# Patient Record
Sex: FEMALE | Race: BLACK OR AFRICAN AMERICAN | Employment: PART TIME | URBAN - METROPOLITAN AREA
[De-identification: names, ages, dates, MRNs, and addresses within clinical notes are randomized per-mention and may not be internally consistent; named-entity substitution may affect disease eponyms.]

---

## 2017-03-08 RX ORDER — TIZANIDINE 4 MG/1
TABLET ORAL
Qty: 90 TABLET | Refills: 0 | Status: SHIPPED | OUTPATIENT
Start: 2017-03-08 | End: 2017-06-01

## 2017-03-08 NOTE — TELEPHONE ENCOUNTER
Refill Protocol Appointment Criteria  · Appointment scheduled in the past 6 months or in the next 3 months  Recent Visits       Provider Department Primary Dx    3 months ago Sanam Dover, [address] [facility], [place] Sinusitis, unspecified c

## 2017-03-10 RX ORDER — IBUPROFEN 600 MG/1
TABLET ORAL
Qty: 60 TABLET | Refills: 0 | Status: SHIPPED | OUTPATIENT
Start: 2017-03-10 | End: 2017-06-01

## 2017-03-14 RX ORDER — DOXEPIN HYDROCHLORIDE 25 MG/1
CAPSULE ORAL
Qty: 30 CAPSULE | Refills: 2 | Status: SHIPPED | OUTPATIENT
Start: 2017-03-14 | End: 2017-06-01

## 2017-03-14 NOTE — TELEPHONE ENCOUNTER
Please advise on refill request. Not taking as prescribed?   Last refilled on 11/29/16 #30 #2RF      Refill Protocol Appointment Criteria  · Appointment scheduled in the past 6 months or in the next 3 months  Recent Visits       Provider Department Primary

## 2017-05-29 ENCOUNTER — APPOINTMENT (OUTPATIENT)
Dept: GENERAL RADIOLOGY | Facility: HOSPITAL | Age: 52
End: 2017-05-29
Attending: NURSE PRACTITIONER
Payer: MEDICARE

## 2017-05-29 ENCOUNTER — HOSPITAL ENCOUNTER (EMERGENCY)
Facility: HOSPITAL | Age: 52
Discharge: HOME | End: 2017-05-29
Payer: MEDICARE

## 2017-05-29 VITALS
DIASTOLIC BLOOD PRESSURE: 67 MMHG | RESPIRATION RATE: 20 BRPM | BODY MASS INDEX: 27.94 KG/M2 | HEIGHT: 61 IN | OXYGEN SATURATION: 97 % | SYSTOLIC BLOOD PRESSURE: 130 MMHG | WEIGHT: 148 LBS | HEART RATE: 95 BPM | TEMPERATURE: 99 F

## 2017-05-29 DIAGNOSIS — J45.20 ASTHMATIC BRONCHITIS, MILD INTERMITTENT, UNCOMPLICATED: Primary | ICD-10-CM

## 2017-05-29 PROCEDURE — 94640 AIRWAY INHALATION TREATMENT: CPT

## 2017-05-29 PROCEDURE — 99283 EMERGENCY DEPT VISIT LOW MDM: CPT

## 2017-05-29 PROCEDURE — 71020 XR CHEST PA + LAT CHEST (CPT=71020): CPT | Performed by: NURSE PRACTITIONER

## 2017-05-29 RX ORDER — IPRATROPIUM BROMIDE AND ALBUTEROL SULFATE 2.5; .5 MG/3ML; MG/3ML
3 SOLUTION RESPIRATORY (INHALATION) ONCE
Status: COMPLETED | OUTPATIENT
Start: 2017-05-29 | End: 2017-05-29

## 2017-05-29 RX ORDER — ALBUTEROL SULFATE 2.5 MG/3ML
2.5 SOLUTION RESPIRATORY (INHALATION) EVERY 4 HOURS PRN
Qty: 30 AMPULE | Refills: 0 | Status: SHIPPED | OUTPATIENT
Start: 2017-05-29 | End: 2017-06-28

## 2017-05-29 NOTE — ED PROVIDER NOTES
Patient Seen in: [facility] AND [facility] Emergency Department    History   Patient presents with:  Cough/URI    Stated Complaint: COUGHING AND SOME SOB SINCE LAST NIGHT HX OF ASTHMA    HPI    Patient presents into the emergency room for evaluation of a cough. EVERY 8 HOURS AS NEEDED   TIZANIDINE HCL 4 MG Oral Tab,  TAKE 1 TABLET(4 MG) BY MOUTH EVERY 8 HOURS AS NEEDED   Albuterol Sulfate  (90 BASE) MCG/ACT Inhalation Aero Soln,  Inhale into the lungs every 6 (six) hours as needed for Wheezing.    Alexia °F (36.9 °C)  Resp 20  Ht 154.9 cm (5' 1\")  Wt 67.132 kg  BMI 27.98 kg/m2  SpO2 97%        Physical Exam   Constitutional: She is oriented to person, place, and time. She appears well-developed and well-nourished. No distress.    HENT:   Head: Normocephali

## 2017-06-01 ENCOUNTER — OFFICE VISIT (OUTPATIENT)
Dept: FAMILY MEDICINE CLINIC | Facility: CLINIC | Age: 52
End: 2017-06-01

## 2017-06-01 ENCOUNTER — TELEPHONE (OUTPATIENT)
Dept: FAMILY MEDICINE CLINIC | Facility: CLINIC | Age: 52
End: 2017-06-01

## 2017-06-01 VITALS
TEMPERATURE: 98 F | DIASTOLIC BLOOD PRESSURE: 87 MMHG | RESPIRATION RATE: 16 BRPM | SYSTOLIC BLOOD PRESSURE: 129 MMHG | BODY MASS INDEX: 26.55 KG/M2 | HEIGHT: 61 IN | HEART RATE: 72 BPM | WEIGHT: 140.63 LBS

## 2017-06-01 DIAGNOSIS — M62.838 NECK MUSCLE SPASM: ICD-10-CM

## 2017-06-01 DIAGNOSIS — J30.89 OTHER ALLERGIC RHINITIS: ICD-10-CM

## 2017-06-01 DIAGNOSIS — F51.04 PSYCHOPHYSIOLOGICAL INSOMNIA: ICD-10-CM

## 2017-06-01 DIAGNOSIS — Z23 NEED FOR VACCINATION: ICD-10-CM

## 2017-06-01 DIAGNOSIS — M54.2 NECK PAIN: ICD-10-CM

## 2017-06-01 DIAGNOSIS — F32.A DEPRESSIVE DISORDER: ICD-10-CM

## 2017-06-01 DIAGNOSIS — J45.901 ASTHMA EXACERBATION: Primary | ICD-10-CM

## 2017-06-01 PROBLEM — J44.89 ASTHMA WITH COPD (CHRONIC OBSTRUCTIVE PULMONARY DISEASE): Chronic | Status: ACTIVE | Noted: 2017-06-01

## 2017-06-01 PROBLEM — J44.9 ASTHMA WITH COPD (CHRONIC OBSTRUCTIVE PULMONARY DISEASE) (HCC): Chronic | Status: ACTIVE | Noted: 2017-06-01

## 2017-06-01 PROBLEM — J44.89 ASTHMA WITH COPD (CHRONIC OBSTRUCTIVE PULMONARY DISEASE) (HCC): Chronic | Status: ACTIVE | Noted: 2017-06-01

## 2017-06-01 PROCEDURE — G0009 ADMIN PNEUMOCOCCAL VACCINE: HCPCS | Performed by: FAMILY MEDICINE

## 2017-06-01 PROCEDURE — 90732 PPSV23 VACC 2 YRS+ SUBQ/IM: CPT | Performed by: FAMILY MEDICINE

## 2017-06-01 PROCEDURE — 99214 OFFICE O/P EST MOD 30 MIN: CPT | Performed by: FAMILY MEDICINE

## 2017-06-01 PROCEDURE — G0463 HOSPITAL OUTPT CLINIC VISIT: HCPCS | Performed by: FAMILY MEDICINE

## 2017-06-01 RX ORDER — MONTELUKAST SODIUM 10 MG/1
10 TABLET ORAL DAILY
Qty: 30 TABLET | Refills: 3 | Status: SHIPPED | OUTPATIENT
Start: 2017-06-01 | End: 2017-11-28

## 2017-06-01 RX ORDER — FLUTICASONE PROPIONATE 50 MCG
2 SPRAY, SUSPENSION (ML) NASAL DAILY
Qty: 1 BOTTLE | Refills: 3 | Status: SHIPPED | OUTPATIENT
Start: 2017-06-01 | End: 2017-09-29

## 2017-06-01 RX ORDER — DOXEPIN HYDROCHLORIDE 25 MG/1
CAPSULE ORAL
Qty: 30 CAPSULE | Refills: 3 | Status: SHIPPED | OUTPATIENT
Start: 2017-06-01 | End: 2018-02-20

## 2017-06-01 NOTE — TELEPHONE ENCOUNTER
[facility] stating that two of the medications that patient is currently taking interact with each other. Please call to clarify. Please advise.      Sertraline HCl 50 MG Oral Tab Take 1/2 tablet po daily for 1 week and then g

## 2017-06-01 NOTE — PROGRESS NOTES
Patient ID: Michel Jeronimo is a 46year old female.       Patient Information      Patient Name Sex KATLYN Shaw Female 1965       ED Provider Notes by ELLIS Strange at 2017 11:47 AM      Author: ELLIS Strange Service: (n emergency room but feels much better now. She states she has no cough, wheezing, chest pain, chest congestion but still has a stuffy nose and sometimes a runny nose. While here she states she has been very depressed for the last 2 months.   Her sister-i Media Tab - Date of Service 01-    REMOVAL GALLBLADDER              Current Outpatient Prescriptions:  albuterol sulfate (2.5 MG/3ML) 0.083% Inhalation Nebu Soln Take 3 mL (2.5 mg total) by nebulization every 4 (four) hours as needed for Wheezing or reviewed. ASSESSMENT/PLAN:     Diagnoses and all orders for this visit:    Asthma exacerbation  -     Montelukast Sodium (SINGULAIR) 10 MG Oral Tab;  Take 1 tablet (10 mg total) by mouth daily.  -     Fluticasone Propionate 50 MCG/ACT Nasal Suspensi

## 2017-06-02 NOTE — TELEPHONE ENCOUNTER
Let them know I am well aware but we will continue these medicines and watch her as she is doing well.

## 2017-06-22 DIAGNOSIS — M54.50 ACUTE LEFT-SIDED LOW BACK PAIN WITHOUT SCIATICA: ICD-10-CM

## 2017-06-22 DIAGNOSIS — S39.012A LUMBAR STRAIN, INITIAL ENCOUNTER: ICD-10-CM

## 2017-06-22 RX ORDER — IBUPROFEN 600 MG/1
TABLET ORAL
Qty: 60 TABLET | Refills: 0 | OUTPATIENT
Start: 2017-06-22

## 2017-06-26 RX ORDER — NABUMETONE 750 MG/1
TABLET, FILM COATED ORAL
Qty: 60 TABLET | Refills: 0 | OUTPATIENT
Start: 2017-06-26

## 2017-06-26 NOTE — TELEPHONE ENCOUNTER
Ramiro ag checking status of refill. Current Outpatient Prescriptions:  TIZANIDINE HCL 4 MG Oral Tab TAKE 1 TABLET(4 MG) BY MOUTH EVERY 8 HOURS AS NEEDED Disp: 90 tablet Rfl: 0`   Nabumetone 750 MG Oral Tab Take 1 tablet (750 mg total) by mouth 2 (two) times daily.  Disp: 60 tablet Rfl: 1`

## 2017-06-27 NOTE — TELEPHONE ENCOUNTER
She cannot take both take tizanidine and the cyclobenzaprine as they are both muscle relaxants. Which one works better for her?

## 2017-07-01 NOTE — TELEPHONE ENCOUNTER
Left message at home tel#[phone] bma [phone]. This is to clarify Dr Connor Jaquez question with pt which medication works better for pt tizanidine 4 mg or nabumetone 750mg? Can't have both.

## 2017-07-03 RX ORDER — NABUMETONE 750 MG/1
TABLET, FILM COATED ORAL
Qty: 60 TABLET | Refills: 0 | OUTPATIENT
Start: 2017-07-03

## 2017-07-03 RX ORDER — TIZANIDINE 4 MG/1
TABLET ORAL
Qty: 90 TABLET | Refills: 0 | Status: CANCELLED | OUTPATIENT
Start: 2017-07-03

## 2017-07-03 RX ORDER — TIZANIDINE 4 MG/1
TABLET ORAL
Qty: 90 TABLET | Refills: 0 | OUTPATIENT
Start: 2017-07-03

## 2017-07-03 RX ORDER — NABUMETONE 750 MG/1
750 TABLET, FILM COATED ORAL 2 TIMES DAILY
Qty: 60 TABLET | Refills: 1 | Status: SHIPPED | OUTPATIENT
Start: 2017-07-03 | End: 2018-01-25

## 2017-07-03 RX ORDER — TIZANIDINE 4 MG/1
TABLET ORAL
Qty: 90 TABLET | Refills: 0 | Status: SHIPPED | OUTPATIENT
Start: 2017-07-03 | End: 2017-12-26

## 2017-07-03 RX ORDER — CYCLOBENZAPRINE HCL 10 MG
TABLET ORAL
Qty: 90 TABLET | Refills: 0 | OUTPATIENT
Start: 2017-07-03

## 2017-07-25 ENCOUNTER — OFFICE VISIT (OUTPATIENT)
Dept: PAIN CLINIC | Facility: HOSPITAL | Age: 52
End: 2017-07-25
Attending: FAMILY MEDICINE
Payer: MEDICARE

## 2017-07-25 DIAGNOSIS — M50.20 HNP (HERNIATED NUCLEUS PULPOSUS), CERVICAL: Primary | ICD-10-CM

## 2017-07-25 PROCEDURE — 99211 OFF/OP EST MAY X REQ PHY/QHP: CPT | Performed by: NURSE PRACTITIONER

## 2017-07-25 NOTE — CHRONIC PAIN
Follow-up Note    HISTORY OF PRESENT ILLNESS:  Torri Jacob is a 46year old old female, originally referred to the pain clinic by Dr. Arnaldo Celestin, returns to the clinic forHNP (herniated nucleus pulposus), cervical  (primary encounter diagnosis) who was orig Negative  Psychiatric:  Negative  Hematologic: No active bleeding  Lymphatic: No current lymphedema  Allergic/Immunologic:  Negative  Musculoskeletal: As above  Neurological: As above  Denies chest pain, shortness of breath.     MEDICAL HISTORY:  Patient Ac 1/1/2010    Smokeless tobacco: Former User    Alcohol use Yes  0.0 oz/week     Comment: WEEKENDS    Drug use: No    Sexual activity: Yes    Partners: Male     Other Topics Concern    Caffeine Concern Yes    Comment: Soda, 3 cans a day     Social History Na small broad-based central/left paracentral disc         extrusion with minimal left paracentral subligamentous extension. Mild asymmetric central narrowing accentuated towards the left. Mild         right greater than left foraminal narrowing.  Face

## 2017-07-25 NOTE — PROGRESS NOTES
Patient here today as a returning patient for cervical pain that radiates down her back. Seen today by Dr. Jose Luis Wilson (see dictation). MD discussed doing a Cervical epidural steroidal injection.   Patient in agreement with plan of care and will obtain a ELENI

## 2017-08-18 ENCOUNTER — HOSPITAL ENCOUNTER (OUTPATIENT)
Facility: HOSPITAL | Age: 52
Setting detail: HOSPITAL OUTPATIENT SURGERY
Discharge: HOME | End: 2017-08-18
Attending: ANESTHESIOLOGY | Admitting: ANESTHESIOLOGY
Payer: MEDICARE

## 2017-08-18 ENCOUNTER — ANESTHESIA EVENT (OUTPATIENT)
Dept: SURGERY | Facility: HOSPITAL | Age: 52
End: 2017-08-18

## 2017-08-18 ENCOUNTER — ANESTHESIA (OUTPATIENT)
Dept: SURGERY | Facility: HOSPITAL | Age: 52
End: 2017-08-18

## 2017-08-18 ENCOUNTER — SURGERY (OUTPATIENT)
Age: 52
End: 2017-08-18

## 2017-08-18 ENCOUNTER — APPOINTMENT (OUTPATIENT)
Dept: GENERAL RADIOLOGY | Facility: HOSPITAL | Age: 52
End: 2017-08-18
Attending: ANESTHESIOLOGY
Payer: MEDICARE

## 2017-08-18 VITALS
HEART RATE: 70 BPM | OXYGEN SATURATION: 96 % | DIASTOLIC BLOOD PRESSURE: 83 MMHG | HEIGHT: 61 IN | BODY MASS INDEX: 28.32 KG/M2 | SYSTOLIC BLOOD PRESSURE: 130 MMHG | WEIGHT: 150 LBS | RESPIRATION RATE: 16 BRPM

## 2017-08-18 PROCEDURE — 3E0R33Z INTRODUCTION OF ANTI-INFLAMMATORY INTO SPINAL CANAL, PERCUTANEOUS APPROACH: ICD-10-PCS | Performed by: ANESTHESIOLOGY

## 2017-08-18 PROCEDURE — 77003 FLUOROGUIDE FOR SPINE INJECT: CPT | Performed by: ANESTHESIOLOGY

## 2017-08-18 PROCEDURE — 3E0R3BZ INTRODUCTION OF ANESTHETIC AGENT INTO SPINAL CANAL, PERCUTANEOUS APPROACH: ICD-10-PCS | Performed by: ANESTHESIOLOGY

## 2017-08-18 RX ORDER — LIDOCAINE HYDROCHLORIDE 10 MG/ML
INJECTION, SOLUTION EPIDURAL; INFILTRATION; INTRACAUDAL; PERINEURAL AS NEEDED
Status: DISCONTINUED | OUTPATIENT
Start: 2017-08-18 | End: 2017-08-18 | Stop reason: HOSPADM

## 2017-08-18 RX ORDER — TRIAMCINOLONE ACETONIDE 40 MG/ML
INJECTION, SUSPENSION INTRA-ARTICULAR; INTRAMUSCULAR AS NEEDED
Status: DISCONTINUED | OUTPATIENT
Start: 2017-08-18 | End: 2017-08-18 | Stop reason: HOSPADM

## 2017-08-18 RX ORDER — BUPIVACAINE HYDROCHLORIDE 2.5 MG/ML
INJECTION, SOLUTION EPIDURAL; INFILTRATION; INTRACAUDAL AS NEEDED
Status: DISCONTINUED | OUTPATIENT
Start: 2017-08-18 | End: 2017-08-18 | Stop reason: HOSPADM

## 2017-08-19 NOTE — OPERATIVE REPORT
[facility]    PATIENT'S NAME: [facility]   ATTENDING PHYSICIAN: Marcial Torres MD   OPERATING PHYSICIAN: Marcial Torres MD   PATIENT ACCOUNT#:   [de-identified]    LOCATION:  [place] [address]  MEDICAL RECORD #:   [id number]       DATE OF BIRTH:  [birth year].  The patient tolerated the procedure well and there were no apparent complications. Blood pressure monitoring, EKG, and pulse oximeter were utilized throughout the procedure. The patient's vital signs remained stable.   The patient had 5/5 upper ext

## 2017-10-09 ENCOUNTER — OFFICE VISIT (OUTPATIENT)
Dept: PODIATRY CLINIC | Facility: CLINIC | Age: 52
End: 2017-10-09

## 2017-10-09 ENCOUNTER — HOSPITAL ENCOUNTER (OUTPATIENT)
Dept: GENERAL RADIOLOGY | Age: 52
Discharge: HOME | End: 2017-10-09
Attending: PODIATRIST | Admitting: PODIATRIST
Payer: MEDICARE

## 2017-10-09 DIAGNOSIS — M79.672 LEFT FOOT PAIN: ICD-10-CM

## 2017-10-09 DIAGNOSIS — S90.32XA CONTUSION OF LEFT FOOT, INITIAL ENCOUNTER: ICD-10-CM

## 2017-10-09 DIAGNOSIS — M79.672 LEFT FOOT PAIN: Primary | ICD-10-CM

## 2017-10-09 PROCEDURE — 99213 OFFICE O/P EST LOW 20 MIN: CPT | Performed by: PODIATRIST

## 2017-10-09 PROCEDURE — 73630 X-RAY EXAM OF FOOT: CPT | Performed by: PODIATRIST

## 2017-10-09 PROCEDURE — G0463 HOSPITAL OUTPT CLINIC VISIT: HCPCS | Performed by: PODIATRIST

## 2017-10-09 NOTE — PROGRESS NOTES
HPI:    Patient ID: Yaneth Paez is a 45year old female. HPI  This 78-year-old female presents with a concern associated the middle toe of the right foot.   Apparently the medial border had a blister like that she opened up and now she is having no co ecchymosis there is no open or draining there is no obvious clinical deformity but based on the level of pain I have encouraged her to have an x-ray.   She was sent for an x-ray and I encouraged her to wear shoes limit weightbearing and ice until I have not

## 2017-12-29 RX ORDER — TIZANIDINE 4 MG/1
TABLET ORAL
Qty: 90 TABLET | Refills: 0 | Status: SHIPPED | OUTPATIENT
Start: 2017-12-29 | End: 2018-02-20

## 2018-01-17 ENCOUNTER — OFFICE VISIT (OUTPATIENT)
Dept: OBGYN CLINIC | Facility: CLINIC | Age: 53
End: 2018-01-17

## 2018-01-17 VITALS
WEIGHT: 125 LBS | DIASTOLIC BLOOD PRESSURE: 75 MMHG | HEART RATE: 87 BPM | SYSTOLIC BLOOD PRESSURE: 125 MMHG | BODY MASS INDEX: 24 KG/M2

## 2018-01-17 DIAGNOSIS — Z12.31 ENCOUNTER FOR SCREENING MAMMOGRAM FOR BREAST CANCER: ICD-10-CM

## 2018-01-17 DIAGNOSIS — Z01.419 ENCOUNTER FOR GYNECOLOGICAL EXAMINATION WITHOUT ABNORMAL FINDING: Primary | ICD-10-CM

## 2018-01-17 DIAGNOSIS — N94.9 VAGINAL BURNING: ICD-10-CM

## 2018-01-17 PROCEDURE — G0101 CA SCREEN;PELVIC/BREAST EXAM: HCPCS | Performed by: OBSTETRICS & GYNECOLOGY

## 2018-01-17 PROCEDURE — 99396 PREV VISIT EST AGE 40-64: CPT | Performed by: OBSTETRICS & GYNECOLOGY

## 2018-01-17 RX ORDER — MONTELUKAST SODIUM 10 MG/1
10 TABLET ORAL NIGHTLY
COMMUNITY
Start: 2017-12-26 | End: 2021-02-05

## 2018-01-17 NOTE — PROGRESS NOTES
Well Woman Exam    HPI:  The patient is a 48yo female who presents for annual exam. She notes new vaginal burning and itching which started over a week ago. Denies changes in her soaps or detergents.  States she has had BV in the past. Denies odor or discha tobacco: Former User    Alcohol use Yes  0.0 oz/week     Comment: WEEKENDS    Drug use: No    Sexual activity: Yes    Partners: Male     Other Topics Concern    Caffeine Concern Yes    Comment: Soda, 3 cans a day     Social History Narrative   None on file breast pain, lumps, or discharge. Neurological:  denies headaches, extremity weakness  Psychiatric: denies depression or anxiety.        01/17/18  1116   BP: 125/75   Pulse: 87       PHYSICAL EXAM:   Constitutional: well developed, well nourished  Head/Fa

## 2018-01-18 ENCOUNTER — TELEPHONE (OUTPATIENT)
Dept: OBGYN CLINIC | Facility: CLINIC | Age: 53
End: 2018-01-18

## 2018-01-18 RX ORDER — METRONIDAZOLE 500 MG/1
500 TABLET ORAL 2 TIMES DAILY
Qty: 14 TABLET | Refills: 0 | Status: SHIPPED | OUTPATIENT
Start: 2018-01-18 | End: 2018-02-20

## 2018-01-18 NOTE — TELEPHONE ENCOUNTER
----- Message from Roshni Woo MD sent at 1/18/2018 12:48 PM CST -----  Positive for BV. Please let patient know and send Rx for Flagyl 500mg BID for 7 days.

## 2018-01-18 NOTE — TELEPHONE ENCOUNTER
Pt informed of results and verbalizes understanding. Rx sent. Pt advised to avoid alcohol while taking medication. Pt also advised to use plain soap like Dive and to avoid douche or fragrant soaps.

## 2018-01-19 LAB
CANDIDA SCREEN: NEGATIVE
GENITAL VAGINOSIS SCREEN: POSITIVE
TRICHOMONAS SCREEN: NEGATIVE

## 2018-01-25 DIAGNOSIS — S39.012A LUMBAR STRAIN, INITIAL ENCOUNTER: ICD-10-CM

## 2018-01-25 DIAGNOSIS — M54.50 ACUTE LEFT-SIDED LOW BACK PAIN WITHOUT SCIATICA: ICD-10-CM

## 2018-01-26 ENCOUNTER — HOSPITAL ENCOUNTER (OUTPATIENT)
Dept: MAMMOGRAPHY | Age: 53
Discharge: HOME | End: 2018-01-26
Attending: OBSTETRICS & GYNECOLOGY
Payer: MEDICARE

## 2018-01-26 ENCOUNTER — HOSPITAL ENCOUNTER (OUTPATIENT)
Dept: MAMMOGRAPHY | Age: 53
End: 2018-01-26
Attending: OBSTETRICS & GYNECOLOGY
Payer: MEDICARE

## 2018-01-26 DIAGNOSIS — Z12.31 ENCOUNTER FOR SCREENING MAMMOGRAM FOR BREAST CANCER: ICD-10-CM

## 2018-01-26 PROCEDURE — 77067 SCR MAMMO BI INCL CAD: CPT | Performed by: OBSTETRICS & GYNECOLOGY

## 2018-01-29 NOTE — TELEPHONE ENCOUNTER
NABUMETONE Protocol failed, please advise on prescription request, NO APPT WITHIN LAST 6 MONTHS  Refill Protocol Appointment Criteria  · Appointment scheduled in the past 6 months or in the next 3 months  Recent Outpatient Visits            1 week ago Saundra

## 2018-01-30 RX ORDER — NABUMETONE 750 MG/1
TABLET, FILM COATED ORAL
Qty: 60 TABLET | Refills: 0 | Status: SHIPPED | OUTPATIENT
Start: 2018-01-30 | End: 2018-04-24

## 2018-02-20 ENCOUNTER — OFFICE VISIT (OUTPATIENT)
Dept: FAMILY MEDICINE CLINIC | Facility: CLINIC | Age: 53
End: 2018-02-20

## 2018-02-20 ENCOUNTER — HOSPITAL ENCOUNTER (OUTPATIENT)
Dept: GENERAL RADIOLOGY | Age: 53
Discharge: HOME | End: 2018-02-20
Attending: FAMILY MEDICINE | Admitting: FAMILY MEDICINE
Payer: MEDICARE

## 2018-02-20 VITALS
BODY MASS INDEX: 22.84 KG/M2 | HEART RATE: 78 BPM | HEIGHT: 61 IN | SYSTOLIC BLOOD PRESSURE: 119 MMHG | WEIGHT: 121 LBS | TEMPERATURE: 98 F | DIASTOLIC BLOOD PRESSURE: 75 MMHG

## 2018-02-20 DIAGNOSIS — F17.200 TOBACCO USE DISORDER: ICD-10-CM
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
## 2018-02-20 DIAGNOSIS — S46.812A STRAIN OF LEFT TRAPEZIUS MUSCLE, INITIAL ENCOUNTER: ICD-10-CM

## 2018-02-20 DIAGNOSIS — R63.4 WEIGHT LOSS: ICD-10-CM

## 2018-02-20 DIAGNOSIS — M54.2 CERVICALGIA: ICD-10-CM

## 2018-02-20 DIAGNOSIS — M54.2 NECK PAIN, BILATERAL: Primary | ICD-10-CM

## 2018-02-20 PROCEDURE — 71046 X-RAY EXAM CHEST 2 VIEWS: CPT | Performed by: FAMILY MEDICINE

## 2018-02-20 PROCEDURE — 99214 OFFICE O/P EST MOD 30 MIN: CPT | Performed by: FAMILY MEDICINE

## 2018-02-20 PROCEDURE — G0463 HOSPITAL OUTPT CLINIC VISIT: HCPCS | Performed by: FAMILY MEDICINE

## 2018-02-20 RX ORDER — TIZANIDINE 4 MG/1
TABLET ORAL
Qty: 90 TABLET | Refills: 0 | Status: SHIPPED | OUTPATIENT
Start: 2018-02-20 | End: 2018-03-21

## 2018-02-23 ENCOUNTER — LAB ENCOUNTER (OUTPATIENT)
Dept: LAB | Age: 53
End: 2018-02-23
Attending: FAMILY MEDICINE
Payer: MEDICARE

## 2018-02-23 DIAGNOSIS — R63.4 WEIGHT LOSS: ICD-10-CM

## 2018-02-23 LAB
ALBUMIN SERPL BCP-MCNC: 4.4 G/DL (ref 3.5–4.8)
ALBUMIN/GLOB SERPL: 1.5 {RATIO} (ref 1–2)
ALP SERPL-CCNC: 81 U/L (ref 32–100)
ALT SERPL-CCNC: 41 U/L (ref 14–54)
ANION GAP SERPL CALC-SCNC: 8 MMOL/L (ref 0–18)
AST SERPL-CCNC: 48 U/L (ref 15–41)
BASOPHILS # BLD: 0 K/UL (ref 0–0.2)
BASOPHILS NFR BLD: 1 %
BILIRUB SERPL-MCNC: 0.9 MG/DL (ref 0.3–1.2)
BUN SERPL-MCNC: 8 MG/DL (ref 8–20)
BUN/CREAT SERPL: 11.8 (ref 10–20)
CALCIUM SERPL-MCNC: 9.6 MG/DL (ref 8.5–10.5)
CHLORIDE SERPL-SCNC: 104 MMOL/L (ref 95–110)
CO2 SERPL-SCNC: 27 MMOL/L (ref 22–32)
CREAT SERPL-MCNC: 0.68 MG/DL (ref 0.5–1.5)
EOSINOPHIL # BLD: 0.1 K/UL (ref 0–0.7)
EOSINOPHIL NFR BLD: 3 %
ERYTHROCYTE [DISTWIDTH] IN BLOOD BY AUTOMATED COUNT: 13.5 % (ref 11–15)
GLOBULIN PLAS-MCNC: 2.9 G/DL (ref 2.5–3.7)
GLUCOSE SERPL-MCNC: 91 MG/DL (ref 70–99)
HCT VFR BLD AUTO: 40.7 % (ref 35–48)
HGB BLD-MCNC: 13.5 G/DL (ref 12–16)
LYMPHOCYTES # BLD: 1.3 K/UL (ref 1–4)
LYMPHOCYTES NFR BLD: 42 %
MCH RBC QN AUTO: 33.2 PG (ref 27–32)
MCHC RBC AUTO-ENTMCNC: 33.3 G/DL (ref 32–37)
MCV RBC AUTO: 99.8 FL (ref 80–100)
MONOCYTES # BLD: 0.4 K/UL (ref 0–1)
MONOCYTES NFR BLD: 11 %
NEUTROPHILS # BLD AUTO: 1.3 K/UL (ref 1.8–7.7)
NEUTROPHILS NFR BLD: 43 %
OSMOLALITY UR CALC.SUM OF ELEC: 286 MOSM/KG (ref 275–295)
PATIENT FASTING: YES
PLATELET # BLD AUTO: 269 K/UL (ref 140–400)
PMV BLD AUTO: 8.6 FL (ref 7.4–10.3)
POTASSIUM SERPL-SCNC: 4.2 MMOL/L (ref 3.3–5.1)
PROT SERPL-MCNC: 7.3 G/DL (ref 5.9–8.4)
RBC # BLD AUTO: 4.07 M/UL (ref 3.7–5.4)
SODIUM SERPL-SCNC: 139 MMOL/L (ref 136–144)
TSH SERPL-ACNC: 3.08 UIU/ML (ref 0.45–5.33)
WBC # BLD AUTO: 3.1 K/UL (ref 4–11)

## 2018-02-23 PROCEDURE — 84443 ASSAY THYROID STIM HORMONE: CPT

## 2018-02-23 PROCEDURE — 36415 COLL VENOUS BLD VENIPUNCTURE: CPT

## 2018-02-23 PROCEDURE — 85025 COMPLETE CBC W/AUTO DIFF WBC: CPT

## 2018-02-23 PROCEDURE — 80053 COMPREHEN METABOLIC PANEL: CPT

## 2018-02-27 ENCOUNTER — TELEPHONE (OUTPATIENT)
Dept: OTHER | Age: 53
End: 2018-02-27

## 2018-02-27 ENCOUNTER — TELEPHONE (OUTPATIENT)
Dept: FAMILY MEDICINE CLINIC | Facility: CLINIC | Age: 53
End: 2018-02-27

## 2018-02-27 DIAGNOSIS — S46.819D STRAIN OF TRAPEZIUS MUSCLE, UNSPECIFIED LATERALITY, SUBSEQUENT ENCOUNTER: ICD-10-CM

## 2018-02-27 DIAGNOSIS — M54.2 BILATERAL POSTERIOR NECK PAIN: Primary | ICD-10-CM

## 2018-02-27 DIAGNOSIS — D72.819 LEUKOPENIA, UNSPECIFIED TYPE: Primary | ICD-10-CM

## 2018-02-27 NOTE — TELEPHONE ENCOUNTER
----- Message from Nichelle Richards DO sent at 2/25/2018  3:19 PM CST -----  Thyroid test is normal.  Kidney function, sugar, liver function are okay. CBC shows that your white blood cell count is low but you are not anemic.   My nurse will order a CBC with

## 2018-02-27 NOTE — TELEPHONE ENCOUNTER
No, she should not take any over-the-counter anti-inflammatories I placed her on Relafen 750 mg twice daily which is the anti-inflammatory. If she needs to take anything on top of that she could take Tylenol.

## 2018-02-27 NOTE — TELEPHONE ENCOUNTER
Action Requested: Summary for Provider     []  Critical Lab, Recommendations Needed  [x] Need Additional Advice  []   FYI    [x]   Need Orders  [] Need Medications Sent to Pharmacy  []  Other     SUMMARY: Dr Vidal Goldstein, discussed labs with patient, agreed to

## 2018-02-27 NOTE — TELEPHONE ENCOUNTER
DR VAZQUEZ: do you approve she take OTC nsaids for pain for day time use? Muscle relaxers make her drowsy. Please advise before patient is contacted.

## 2018-02-27 NOTE — TELEPHONE ENCOUNTER
Spoke with patient (identified name and ), results reviewed and agrees with plan.   Will repeat non fasting labs on or after 3/23/18

## 2018-03-01 NOTE — TELEPHONE ENCOUNTER
Pt informed of Dr Addie Crawley orders from 2/27/18 who verbalized understanding and agreed with md plan. Pt just purchased the relafen today and will start it.

## 2018-03-21 DIAGNOSIS — M54.2 CERVICALGIA: ICD-10-CM

## 2018-03-21 DIAGNOSIS — M54.2 NECK PAIN, BILATERAL: ICD-10-CM

## 2018-03-21 DIAGNOSIS — S46.812A STRAIN OF LEFT TRAPEZIUS MUSCLE, INITIAL ENCOUNTER: ICD-10-CM

## 2018-03-25 RX ORDER — TIZANIDINE 4 MG/1
TABLET ORAL
Qty: 90 TABLET | Refills: 0 | Status: SHIPPED | OUTPATIENT
Start: 2018-03-25 | End: 2018-04-24

## 2018-04-02 ENCOUNTER — OFFICE VISIT (OUTPATIENT)
Dept: PAIN CLINIC | Facility: HOSPITAL | Age: 53
End: 2018-04-02
Attending: FAMILY MEDICINE
Payer: MEDICARE

## 2018-04-02 VITALS
SYSTOLIC BLOOD PRESSURE: 113 MMHG | HEIGHT: 61 IN | HEART RATE: 76 BPM | WEIGHT: 124 LBS | BODY MASS INDEX: 23.41 KG/M2 | DIASTOLIC BLOOD PRESSURE: 74 MMHG | RESPIRATION RATE: 16 BRPM

## 2018-04-02 DIAGNOSIS — M54.2 CERVICALGIA: ICD-10-CM

## 2018-04-02 DIAGNOSIS — M54.2 NECK PAIN, BILATERAL: ICD-10-CM

## 2018-04-02 PROCEDURE — 99211 OFF/OP EST MAY X REQ PHY/QHP: CPT

## 2018-04-02 NOTE — CHRONIC PAIN
Follow-up Note    HISTORY OF PRESENT ILLNESS:  George Phillips is a 45year old old female, originally referred to the pain clinic by Dr. Mavis Hoskins, returns to the clinic forNeck pain, bilateral  Cervicalgia who was originally seen by Dr. Tyesha Velarde in September Chalazion of left upper eyelid     Acute chest pain     Azotemia     Abnormal EKG     Depressive disorder     Asthma with COPD (chronic obstructive pulmonary disease) (HCC)    Past Medical History:   Diagnosis Date   • Asthma    • Back problem    • Cholecy BP: 113/74   Pulse: 76   Resp: 16      General: Alert and oriented x3  Affect:  NAD  Eyes: anicteric; no injection  Gait: Normal;  cane user - No  Spine: Normal trapezial tenderness and tightening bilaterally no isolated trigger points noted      IMAGING foraminal narrowing. Facet joints intact. C6-C7: No significant disc/facet abnormality, spinal stenosis, or foraminal  stenosis. C7-T1:Mild disc degeneration. No herniation. Facet joints intact. No  stenosis.     CONCLUSION:  1.  C5-6: Disc degeneration w

## 2018-04-18 ENCOUNTER — TELEPHONE (OUTPATIENT)
Dept: PAIN CLINIC | Facility: HOSPITAL | Age: 53
End: 2018-04-18

## 2018-04-24 ENCOUNTER — OFFICE VISIT (OUTPATIENT)
Dept: FAMILY MEDICINE CLINIC | Facility: CLINIC | Age: 53
End: 2018-04-24

## 2018-04-24 VITALS
SYSTOLIC BLOOD PRESSURE: 126 MMHG | TEMPERATURE: 99 F | RESPIRATION RATE: 12 BRPM | DIASTOLIC BLOOD PRESSURE: 75 MMHG | HEIGHT: 61 IN | BODY MASS INDEX: 23.11 KG/M2 | WEIGHT: 122.38 LBS | HEART RATE: 66 BPM

## 2018-04-24 DIAGNOSIS — M50.30 DDD (DEGENERATIVE DISC DISEASE), CERVICAL: ICD-10-CM

## 2018-04-24 DIAGNOSIS — M54.2 NECK PAIN, BILATERAL POSTERIOR: ICD-10-CM

## 2018-04-24 DIAGNOSIS — M54.2 NECK PAIN, BILATERAL: ICD-10-CM

## 2018-04-24 DIAGNOSIS — M62.838 NECK MUSCLE SPASM: ICD-10-CM

## 2018-04-24 DIAGNOSIS — S46.819D STRAIN OF TRAPEZIUS MUSCLE, UNSPECIFIED LATERALITY, SUBSEQUENT ENCOUNTER: Primary | ICD-10-CM

## 2018-04-24 PROCEDURE — 99212 OFFICE O/P EST SF 10 MIN: CPT | Performed by: FAMILY MEDICINE

## 2018-04-24 PROCEDURE — 99214 OFFICE O/P EST MOD 30 MIN: CPT | Performed by: FAMILY MEDICINE

## 2018-04-24 RX ORDER — TIZANIDINE 4 MG/1
TABLET ORAL
Qty: 270 TABLET | Refills: 0 | Status: SHIPPED | OUTPATIENT
Start: 2018-04-24 | End: 2018-06-19

## 2018-04-24 RX ORDER — NABUMETONE 750 MG/1
TABLET, FILM COATED ORAL
Qty: 180 TABLET | Refills: 0 | Status: SHIPPED | OUTPATIENT
Start: 2018-04-24 | End: 2018-06-19

## 2018-04-24 NOTE — PROGRESS NOTES
Patient ID: Lexi Arnold is a 45year old female. HPI  Patient presents with: Follow - Up: neck pain      She will get a second epidural steroid injection in her neck on May 4, 2018 by the pain clinic.   She had one in August 2017 which did help cons Rfl: 0   NABUMETONE 750 MG Oral Tab TAKE 1 TABLET(750 MG) BY MOUTH TWICE DAILY Disp: 60 tablet Rfl: 0   Montelukast Sodium 10 MG Oral Tab  Disp:  Rfl:    Albuterol Sulfate  (90 BASE) MCG/ACT Inhalation Aero Soln Inhale into the lungs every 6 (six) h Nerve Stimulator (STANDARD TENS) Does not apply Device; Apply 1 Units topically as needed. Use the HOME TENS unit for electrical stimulation as needed for discomfort and pain on the muscles. Fax form to Scar at [facility] at [phone].     She i trapezius muscle.   -     TiZANidine HCl 4 MG Oral Tab; TAKE 1 TABLET(4 MG) BY MOUTH EVERY 8 HOURS AS NEEDED  -     Nabumetone 750 MG Oral Tab; TAKE 1 TABLET(750 MG) BY MOUTH TWICE DAILY    DDD (degenerative disc disease), cervical  -     Nerve Stimulator (

## 2018-05-01 ENCOUNTER — OFFICE VISIT (OUTPATIENT)
Dept: FAMILY MEDICINE CLINIC | Facility: CLINIC | Age: 53
End: 2018-05-01

## 2018-05-01 ENCOUNTER — NURSE TRIAGE (OUTPATIENT)
Dept: OTHER | Age: 53
End: 2018-05-01

## 2018-05-01 VITALS
WEIGHT: 123 LBS | SYSTOLIC BLOOD PRESSURE: 103 MMHG | HEART RATE: 55 BPM | TEMPERATURE: 98 F | DIASTOLIC BLOOD PRESSURE: 64 MMHG | BODY MASS INDEX: 23.22 KG/M2 | HEIGHT: 61 IN

## 2018-05-01 DIAGNOSIS — M54.2 NECK PAIN, BILATERAL POSTERIOR: ICD-10-CM

## 2018-05-01 DIAGNOSIS — M50.30 DDD (DEGENERATIVE DISC DISEASE), CERVICAL: ICD-10-CM

## 2018-05-01 DIAGNOSIS — Z63.6 CAREGIVER BURDEN: ICD-10-CM

## 2018-05-01 DIAGNOSIS — M62.838 NECK MUSCLE SPASM: Primary | ICD-10-CM

## 2018-05-01 PROCEDURE — 99212 OFFICE O/P EST SF 10 MIN: CPT | Performed by: FAMILY MEDICINE

## 2018-05-01 PROCEDURE — 99213 OFFICE O/P EST LOW 20 MIN: CPT | Performed by: FAMILY MEDICINE

## 2018-05-01 SDOH — SOCIAL STABILITY - SOCIAL INSECURITY: DEPENDENT RELATIVE NEEDING CARE AT HOME: Z63.6

## 2018-05-01 NOTE — PROGRESS NOTES
HPI:    Patient ID: Lexi Arnold is a 45year old female. HPI     Patient here with complains of having spasm on the left side of her neck.   She states she has had recurrent chronic neck pain and is scheduled to have her third epidural injection in he Rfl: 0   Montelukast Sodium 10 MG Oral Tab  Disp:  Rfl:      Allergies:No Known Allergies   PHYSICAL EXAM:   Physical Exam   Constitutional: She appears well-developed and well-nourished. Musculoskeletal: She exhibits tenderness.  She exhibits no edema or

## 2018-05-01 NOTE — TELEPHONE ENCOUNTER
Action Requested: Summary for Provider     []  Critical Lab, Recommendations Needed  [] Need Additional Advice  []   FYI    []   Need Orders  [] Need Medications Sent to Pharmacy  []  Other     SUMMARY: Pt was scheduled for OV today.     Pt called due to wo

## 2018-05-04 ENCOUNTER — OFFICE VISIT (OUTPATIENT)
Dept: PAIN CLINIC | Facility: HOSPITAL | Age: 53
End: 2018-05-04
Attending: ANESTHESIOLOGY
Payer: MEDICARE

## 2018-05-04 VITALS — DIASTOLIC BLOOD PRESSURE: 77 MMHG | SYSTOLIC BLOOD PRESSURE: 114 MMHG | OXYGEN SATURATION: 99 % | HEART RATE: 71 BPM

## 2018-05-04 DIAGNOSIS — M50.30 DDD (DEGENERATIVE DISC DISEASE), CERVICAL: Primary | ICD-10-CM

## 2018-05-04 PROCEDURE — 99211 OFF/OP EST MAY X REQ PHY/QHP: CPT

## 2018-05-04 RX ORDER — METHYLPREDNISOLONE 4 MG/1
TABLET ORAL
Qty: 21 TABLET | Refills: 0 | Status: SHIPPED | OUTPATIENT
Start: 2018-05-04 | End: 2018-06-19

## 2018-05-04 NOTE — CHRONIC PAIN
Follow-up Note  CC: neck pain   HISTORY OF PRESENT ILLNESS:  Bhavesh Rosales is a 45year old old female, originally referred to the pain clinic by Dr. Mauricio ref.  provider found, with history of DDD (degenerative disc disease), cervical  (primary encounter ellen not exacerbate the pain.   Numbness/tingling: as above  Weakness: as above  Weight Loss: Negative   Fever: Negative   Cardiovascular:  No current chest pain or palpitations   Respiratory:  No current shortness of breath   Gastrointestinal:  No active ulcer Glaucoma Father    • Myocardial infarction [OTHER] Father      per [employer]       SOCIAL HISTORY:    Social History  Social History   Marital status: Single  Spouse name: N/A    Years of education: N/A  Number of children: N/A     Occupational History  None secondary to cervical DDD. Unfortunately patient thought her injection was to be done today but was really her follow up appointment after her injection that was scheduled for 4/20/18. Plan to reschedule patient for the injection. Ordered medrol dose pack.

## 2018-05-09 ENCOUNTER — TELEPHONE (OUTPATIENT)
Dept: PAIN CLINIC | Facility: HOSPITAL | Age: 53
End: 2018-05-09

## 2018-05-15 ENCOUNTER — SURGERY (OUTPATIENT)
Age: 53
End: 2018-05-15

## 2018-05-15 ENCOUNTER — APPOINTMENT (OUTPATIENT)
Dept: GENERAL RADIOLOGY | Facility: HOSPITAL | Age: 53
End: 2018-05-15
Attending: ANESTHESIOLOGY
Payer: MEDICARE

## 2018-05-15 ENCOUNTER — HOSPITAL ENCOUNTER (OUTPATIENT)
Facility: HOSPITAL | Age: 53
Setting detail: HOSPITAL OUTPATIENT SURGERY
Discharge: HOME | End: 2018-05-15
Attending: ANESTHESIOLOGY | Admitting: ANESTHESIOLOGY
Payer: MEDICARE

## 2018-05-15 VITALS
RESPIRATION RATE: 16 BRPM | DIASTOLIC BLOOD PRESSURE: 72 MMHG | TEMPERATURE: 98 F | BODY MASS INDEX: 22.84 KG/M2 | SYSTOLIC BLOOD PRESSURE: 113 MMHG | WEIGHT: 121 LBS | HEIGHT: 61 IN | OXYGEN SATURATION: 98 % | HEART RATE: 66 BPM

## 2018-05-15 PROCEDURE — 3E0R3BZ INTRODUCTION OF ANESTHETIC AGENT INTO SPINAL CANAL, PERCUTANEOUS APPROACH: ICD-10-PCS | Performed by: ANESTHESIOLOGY

## 2018-05-15 PROCEDURE — B01B1ZZ FLUOROSCOPY OF SPINAL CORD USING LOW OSMOLAR CONTRAST: ICD-10-PCS | Performed by: ANESTHESIOLOGY

## 2018-05-15 PROCEDURE — 3E0R33Z INTRODUCTION OF ANTI-INFLAMMATORY INTO SPINAL CANAL, PERCUTANEOUS APPROACH: ICD-10-PCS | Performed by: ANESTHESIOLOGY

## 2018-05-15 PROCEDURE — 77003 FLUOROGUIDE FOR SPINE INJECT: CPT | Performed by: ANESTHESIOLOGY

## 2018-05-15 RX ORDER — LIDOCAINE HYDROCHLORIDE 10 MG/ML
INJECTION, SOLUTION EPIDURAL; INFILTRATION; INTRACAUDAL; PERINEURAL AS NEEDED
Status: DISCONTINUED | OUTPATIENT
Start: 2018-05-15 | End: 2018-05-15 | Stop reason: HOSPADM

## 2018-05-15 RX ORDER — DEXAMETHASONE SODIUM PHOSPHATE 10 MG/ML
INJECTION, SOLUTION INTRAMUSCULAR; INTRAVENOUS AS NEEDED
Status: DISCONTINUED | OUTPATIENT
Start: 2018-05-15 | End: 2018-05-15 | Stop reason: HOSPADM

## 2018-05-15 NOTE — PROCEDURES
Patient was taken to operating room #1. Prone position. Sterile preparation and draping of cervical spine. Lidocaine 1% 3 cc local to C6-7 interspace out it was identified with fluoroscopy.   18-gauge epidural needle was used and epidural space loss-of-r

## 2018-05-15 NOTE — H&P
History & Physical Examination    Patient Name: Salinas Sykes  MRN: [id number]  CSN: [id number]  YOB: 1965    Diagnosis: Low back pain due to degenerative disease of cervical spine with herniation of C6-7.     Present Illness: Patient has mari per [employer]:  \" x3, C/S x1\"     Past Surgical History:  1998:   2006: CHOLECYSTECTOMY  2012: ELECTROCARDIOGRAM, COMPLETE      Comment: Scanned to Media Tab - Date of Service                2012  No date:       Comment:

## 2018-06-19 ENCOUNTER — OFFICE VISIT (OUTPATIENT)
Dept: FAMILY MEDICINE CLINIC | Facility: CLINIC | Age: 53
End: 2018-06-19

## 2018-06-19 VITALS
BODY MASS INDEX: 22.69 KG/M2 | TEMPERATURE: 99 F | HEIGHT: 61 IN | WEIGHT: 120.19 LBS | RESPIRATION RATE: 12 BRPM | SYSTOLIC BLOOD PRESSURE: 115 MMHG | HEART RATE: 78 BPM | DIASTOLIC BLOOD PRESSURE: 73 MMHG

## 2018-06-19 DIAGNOSIS — F51.04 PSYCHOPHYSIOLOGICAL INSOMNIA: ICD-10-CM

## 2018-06-19 DIAGNOSIS — F32.A DEPRESSIVE DISORDER: ICD-10-CM

## 2018-06-19 DIAGNOSIS — G44.209 TENSION HEADACHE: ICD-10-CM

## 2018-06-19 DIAGNOSIS — S46.819D STRAIN OF TRAPEZIUS MUSCLE, UNSPECIFIED LATERALITY, SUBSEQUENT ENCOUNTER: ICD-10-CM

## 2018-06-19 DIAGNOSIS — M54.2 NECK PAIN, BILATERAL: ICD-10-CM

## 2018-06-19 DIAGNOSIS — M62.838 NECK MUSCLE SPASM: ICD-10-CM

## 2018-06-19 DIAGNOSIS — M50.30 DDD (DEGENERATIVE DISC DISEASE), CERVICAL: ICD-10-CM

## 2018-06-19 DIAGNOSIS — F41.9 ANXIETY: ICD-10-CM

## 2018-06-19 DIAGNOSIS — M54.2 NECK PAIN, BILATERAL POSTERIOR: Primary | ICD-10-CM

## 2018-06-19 DIAGNOSIS — M62.838 TRAPEZIUS MUSCLE SPASM: ICD-10-CM

## 2018-06-19 DIAGNOSIS — Z63.6 CAREGIVER BURDEN: ICD-10-CM

## 2018-06-19 PROCEDURE — 99214 OFFICE O/P EST MOD 30 MIN: CPT | Performed by: FAMILY MEDICINE

## 2018-06-19 PROCEDURE — 99212 OFFICE O/P EST SF 10 MIN: CPT | Performed by: FAMILY MEDICINE

## 2018-06-19 RX ORDER — CLONAZEPAM 0.5 MG/1
0.5 TABLET ORAL NIGHTLY PRN
Qty: 30 TABLET | Refills: 1 | Status: SHIPPED | OUTPATIENT
Start: 2018-06-19 | End: 2018-08-06

## 2018-06-19 RX ORDER — NABUMETONE 750 MG/1
TABLET, FILM COATED ORAL
Qty: 180 TABLET | Refills: 0 | Status: SHIPPED | OUTPATIENT
Start: 2018-06-19 | End: 2021-02-05

## 2018-06-19 RX ORDER — TIZANIDINE 4 MG/1
TABLET ORAL
Qty: 270 TABLET | Refills: 0 | Status: SHIPPED | OUTPATIENT
Start: 2018-06-19 | End: 2021-02-05

## 2018-06-19 SDOH — SOCIAL STABILITY - SOCIAL INSECURITY: DEPENDENT RELATIVE NEEDING CARE AT HOME: Z63.6

## 2018-06-19 NOTE — PROGRESS NOTES
Patient ID: Haim Wise is a 45year old female. HPI  Patient presents with:  Pain: neck    She had an epidural steroid injection #3 in May. She states after that her right side of the neck started feeling even worse.   She had more of the epidural Comment: Scanned to Media Tab - Date of Service                2012  No date:       Comment: x3  No date: REMOVAL GALLBLADDER       Current Outpatient Prescriptions:        TiZANidine HCl 4 MG Oral Tab TAKE 1 TABLET(4 MG) BY MOUTH EVERY 8 HOURS A all orders for this visit:    Neck pain, bilateral posterior  -     TiZANidine HCl 4 MG Oral Tab; TAKE 1 TABLET(4 MG) BY MOUTH EVERY 8 HOURS AS NEEDED  -     Nabumetone 750 MG Oral Tab; TAKE 1 TABLET(750 MG) BY MOUTH TWICE DAILY  -     PHYSICAL THERAPY EXT herself. She will start Klonopin and see if this helps more than the doxepin. She must stop the doxepin.   She did not take Klonopin and the tizanidine at the same time and I told her that tizanidine should be taken at dinnertime at the latest.  Psychophy

## 2018-06-19 NOTE — PATIENT INSTRUCTIONS
Keep taking the nabumetone 750 mg twice daily with food. You could take the tizanidine 4 mg 3 times daily as needed for muscle relaxation but at this time I would just take it 3 times daily as her muscles are completely tight.   Take the last dose of tizan

## 2018-07-23 ENCOUNTER — OFFICE VISIT (OUTPATIENT)
Dept: FAMILY MEDICINE CLINIC | Facility: CLINIC | Age: 53
End: 2018-07-23
Payer: MEDICARE

## 2018-07-23 VITALS
SYSTOLIC BLOOD PRESSURE: 119 MMHG | HEART RATE: 80 BPM | TEMPERATURE: 98 F | BODY MASS INDEX: 23 KG/M2 | DIASTOLIC BLOOD PRESSURE: 73 MMHG | WEIGHT: 120 LBS

## 2018-07-23 DIAGNOSIS — R20.2 PARESTHESIA OF BILATERAL LEGS: ICD-10-CM

## 2018-07-23 DIAGNOSIS — R29.898 WEAKNESS OF BOTH LEGS: Primary | ICD-10-CM

## 2018-07-23 DIAGNOSIS — M50.30 DDD (DEGENERATIVE DISC DISEASE), CERVICAL: ICD-10-CM

## 2018-07-23 PROCEDURE — 99214 OFFICE O/P EST MOD 30 MIN: CPT | Performed by: FAMILY MEDICINE

## 2018-07-23 PROCEDURE — 99212 OFFICE O/P EST SF 10 MIN: CPT | Performed by: FAMILY MEDICINE

## 2018-07-23 NOTE — PROGRESS NOTES
Patient ID: Haim Wise is a 45year old female. HPI  Patient presents with:  Leg Pain: pt c/o bilateral leg weakness and numbness X 1 week        Her legs feel funny when she rubs them from her thighs down to her calves.   It is somewhat of a \"numb CHOLECYSTECTOMY  2012: ELECTROCARDIOGRAM, COMPLETE      Comment: Scanned to Media Tab - Date of Service                2012  No date:       Comment: x3  No date: REMOVAL GALLBLADDER       Current Outpatient Prescriptions:  TiZANidine HCl 4 was intact, good strength with plantar flexion and dorsiflexion, gait was normal.  Able to flex forward at the waist and touch the ankles. No bony tenderness. Straight leg raise is negative bilaterally.     When I did get her up off the table to stand o Shaylee Gilbert MD          Requested Specialty: PHYSIATRY          Number of Visits Requested: 3      Physical Therapy ([facility]) - [place] Locations          Order Comments:              Treatment: Evaluate & Treat and use Modalities if needed               Physici

## 2018-07-24 DIAGNOSIS — M62.838 NECK MUSCLE SPASM: ICD-10-CM

## 2018-07-24 DIAGNOSIS — S46.819D STRAIN OF TRAPEZIUS MUSCLE, UNSPECIFIED LATERALITY, SUBSEQUENT ENCOUNTER: ICD-10-CM

## 2018-07-24 DIAGNOSIS — M50.30 DDD (DEGENERATIVE DISC DISEASE), CERVICAL: ICD-10-CM

## 2018-07-24 DIAGNOSIS — M54.2 NECK PAIN, BILATERAL POSTERIOR: ICD-10-CM

## 2018-07-24 DIAGNOSIS — M54.2 NECK PAIN, BILATERAL: ICD-10-CM

## 2018-07-24 RX ORDER — TIZANIDINE 4 MG/1
TABLET ORAL
Qty: 270 TABLET | Refills: 0 | OUTPATIENT
Start: 2018-07-24

## 2018-07-25 ENCOUNTER — HOSPITAL ENCOUNTER (OUTPATIENT)
Dept: GENERAL RADIOLOGY | Age: 53
Discharge: HOME | End: 2018-07-25
Attending: FAMILY MEDICINE
Payer: MEDICARE

## 2018-07-25 DIAGNOSIS — R29.898 WEAKNESS OF BOTH LEGS: ICD-10-CM

## 2018-07-25 DIAGNOSIS — R20.2 PARESTHESIA OF BILATERAL LEGS: ICD-10-CM

## 2018-07-25 PROCEDURE — 72110 X-RAY EXAM L-2 SPINE 4/>VWS: CPT | Performed by: FAMILY MEDICINE

## 2018-07-30 ENCOUNTER — APPOINTMENT (OUTPATIENT)
Dept: GENERAL RADIOLOGY | Facility: HOSPITAL | Age: 53
End: 2018-07-30
Attending: EMERGENCY MEDICINE
Payer: MEDICARE

## 2018-07-30 ENCOUNTER — APPOINTMENT (OUTPATIENT)
Dept: CT IMAGING | Facility: HOSPITAL | Age: 53
End: 2018-07-30
Attending: EMERGENCY MEDICINE
Payer: MEDICARE

## 2018-07-30 ENCOUNTER — HOSPITAL ENCOUNTER (OUTPATIENT)
Age: 53
Discharge: ANOTHER HOSPITAL | End: 2018-07-30
Attending: EMERGENCY MEDICINE
Payer: MEDICARE

## 2018-07-30 ENCOUNTER — HOSPITAL ENCOUNTER (OUTPATIENT)
Facility: HOSPITAL | Age: 53
Setting detail: OBSERVATION
Discharge: HOME | End: 2018-07-31
Attending: EMERGENCY MEDICINE | Admitting: HOSPITALIST
Payer: MEDICARE

## 2018-07-30 ENCOUNTER — APPOINTMENT (OUTPATIENT)
Dept: MRI IMAGING | Facility: HOSPITAL | Age: 53
End: 2018-07-30
Attending: HOSPITALIST
Payer: MEDICARE

## 2018-07-30 ENCOUNTER — APPOINTMENT (OUTPATIENT)
Dept: ULTRASOUND IMAGING | Facility: HOSPITAL | Age: 53
End: 2018-07-30
Attending: HOSPITALIST
Payer: MEDICARE

## 2018-07-30 VITALS
HEIGHT: 61 IN | HEART RATE: 92 BPM | TEMPERATURE: 98 F | SYSTOLIC BLOOD PRESSURE: 122 MMHG | WEIGHT: 165 LBS | DIASTOLIC BLOOD PRESSURE: 69 MMHG | BODY MASS INDEX: 31.15 KG/M2 | OXYGEN SATURATION: 98 % | RESPIRATION RATE: 16 BRPM

## 2018-07-30 DIAGNOSIS — R53.1 RIGHT SIDED WEAKNESS: ICD-10-CM

## 2018-07-30 DIAGNOSIS — R07.9 CHEST PAIN OF UNCERTAIN ETIOLOGY: Primary | ICD-10-CM

## 2018-07-30 DIAGNOSIS — R29.898 WEAKNESS OF RIGHT UPPER EXTREMITY: Primary | ICD-10-CM

## 2018-07-30 LAB
ANION GAP SERPL CALC-SCNC: 8 MMOL/L (ref 0–18)
BASOPHILS # BLD: 0 K/UL (ref 0–0.2)
BASOPHILS NFR BLD: 0 %
BUN SERPL-MCNC: 10 MG/DL (ref 8–20)
BUN/CREAT SERPL: 13.3 (ref 10–20)
CALCIUM SERPL-MCNC: 8.9 MG/DL (ref 8.5–10.5)
CHLORIDE SERPL-SCNC: 104 MMOL/L (ref 95–110)
CHOLEST SERPL-MCNC: 154 MG/DL (ref 110–200)
CO2 SERPL-SCNC: 24 MMOL/L (ref 22–32)
CREAT SERPL-MCNC: 0.75 MG/DL (ref 0.5–1.5)
EOSINOPHIL # BLD: 0.1 K/UL (ref 0–0.7)
EOSINOPHIL NFR BLD: 2 %
ERYTHROCYTE [DISTWIDTH] IN BLOOD BY AUTOMATED COUNT: 14.2 % (ref 11–15)
GLUCOSE SERPL-MCNC: 107 MG/DL (ref 70–99)
HCT VFR BLD AUTO: 37.2 % (ref 35–48)
HDLC SERPL-MCNC: 74 MG/DL
HGB BLD-MCNC: 12.4 G/DL (ref 12–16)
LDLC SERPL CALC-MCNC: 67 MG/DL (ref 0–99)
LYMPHOCYTES # BLD: 1.2 K/UL (ref 1–4)
LYMPHOCYTES NFR BLD: 34 %
MCH RBC QN AUTO: 33.3 PG (ref 27–32)
MCHC RBC AUTO-ENTMCNC: 33.3 G/DL (ref 32–37)
MCV RBC AUTO: 100.1 FL (ref 80–100)
MONOCYTES # BLD: 0.4 K/UL (ref 0–1)
MONOCYTES NFR BLD: 11 %
NEUTROPHILS # BLD AUTO: 1.9 K/UL (ref 1.8–7.7)
NEUTROPHILS NFR BLD: 54 %
NONHDLC SERPL-MCNC: 80 MG/DL
OSMOLALITY UR CALC.SUM OF ELEC: 282 MOSM/KG (ref 275–295)
PLATELET # BLD AUTO: 219 K/UL (ref 140–400)
PMV BLD AUTO: 8 FL (ref 7.4–10.3)
POTASSIUM SERPL-SCNC: 3.2 MMOL/L (ref 3.3–5.1)
RBC # BLD AUTO: 3.71 M/UL (ref 3.7–5.4)
SODIUM SERPL-SCNC: 136 MMOL/L (ref 136–144)
TRIGL SERPL-MCNC: 66 MG/DL (ref 1–149)
TROPONIN I SERPL-MCNC: 0 NG/ML (ref ?–0.03)
WBC # BLD AUTO: 3.6 K/UL (ref 4–11)

## 2018-07-30 PROCEDURE — 71045 X-RAY EXAM CHEST 1 VIEW: CPT | Performed by: EMERGENCY MEDICINE

## 2018-07-30 PROCEDURE — 99214 OFFICE O/P EST MOD 30 MIN: CPT

## 2018-07-30 PROCEDURE — 72156 MRI NECK SPINE W/O & W/DYE: CPT | Performed by: HOSPITALIST

## 2018-07-30 PROCEDURE — 93005 ELECTROCARDIOGRAM TRACING: CPT

## 2018-07-30 PROCEDURE — 93880 EXTRACRANIAL BILAT STUDY: CPT | Performed by: HOSPITALIST

## 2018-07-30 PROCEDURE — 70450 CT HEAD/BRAIN W/O DYE: CPT | Performed by: EMERGENCY MEDICINE

## 2018-07-30 PROCEDURE — 93010 ELECTROCARDIOGRAM REPORT: CPT | Performed by: EMERGENCY MEDICINE

## 2018-07-30 PROCEDURE — 93010 ELECTROCARDIOGRAM REPORT: CPT

## 2018-07-30 PROCEDURE — 70551 MRI BRAIN STEM W/O DYE: CPT | Performed by: HOSPITALIST

## 2018-07-30 PROCEDURE — 99204 OFFICE O/P NEW MOD 45 MIN: CPT | Performed by: OTHER

## 2018-07-30 PROCEDURE — 99220 INITIAL OBSERVATION CARE,LEVL III: CPT | Performed by: HOSPITALIST

## 2018-07-30 RX ORDER — POTASSIUM CHLORIDE 20 MEQ/1
40 TABLET, EXTENDED RELEASE ORAL EVERY 4 HOURS
Status: COMPLETED | OUTPATIENT
Start: 2018-07-30 | End: 2018-07-31

## 2018-07-30 RX ORDER — ASPIRIN 325 MG
325 TABLET ORAL DAILY
Status: DISCONTINUED | OUTPATIENT
Start: 2018-07-30 | End: 2018-07-31

## 2018-07-30 RX ORDER — BISACODYL 10 MG
10 SUPPOSITORY, RECTAL RECTAL
Status: DISCONTINUED | OUTPATIENT
Start: 2018-07-30 | End: 2018-07-31

## 2018-07-30 RX ORDER — MORPHINE SULFATE 2 MG/ML
2 INJECTION, SOLUTION INTRAMUSCULAR; INTRAVENOUS EVERY 2 HOUR PRN
Status: DISCONTINUED | OUTPATIENT
Start: 2018-07-30 | End: 2018-07-31

## 2018-07-30 RX ORDER — ASPIRIN 81 MG/1
324 TABLET, CHEWABLE ORAL ONCE
Status: DISCONTINUED | OUTPATIENT
Start: 2018-07-30 | End: 2018-07-31

## 2018-07-30 RX ORDER — ASPIRIN 300 MG
300 SUPPOSITORY, RECTAL RECTAL DAILY
Status: DISCONTINUED | OUTPATIENT
Start: 2018-07-30 | End: 2018-07-31

## 2018-07-30 RX ORDER — METOCLOPRAMIDE HYDROCHLORIDE 5 MG/ML
10 INJECTION INTRAMUSCULAR; INTRAVENOUS EVERY 8 HOURS PRN
Status: DISCONTINUED | OUTPATIENT
Start: 2018-07-30 | End: 2018-07-31

## 2018-07-30 RX ORDER — POLYETHYLENE GLYCOL 3350 17 G/17G
17 POWDER, FOR SOLUTION ORAL DAILY PRN
Status: DISCONTINUED | OUTPATIENT
Start: 2018-07-30 | End: 2018-07-31

## 2018-07-30 RX ORDER — ONDANSETRON 2 MG/ML
4 INJECTION INTRAMUSCULAR; INTRAVENOUS EVERY 6 HOURS PRN
Status: DISCONTINUED | OUTPATIENT
Start: 2018-07-30 | End: 2018-07-31

## 2018-07-30 RX ORDER — ALBUTEROL SULFATE 90 UG/1
2 AEROSOL, METERED RESPIRATORY (INHALATION) EVERY 6 HOURS PRN
Status: DISCONTINUED | OUTPATIENT
Start: 2018-07-30 | End: 2018-07-31

## 2018-07-30 RX ORDER — DOCUSATE SODIUM 100 MG/1
100 CAPSULE, LIQUID FILLED ORAL 2 TIMES DAILY
Status: DISCONTINUED | OUTPATIENT
Start: 2018-07-30 | End: 2018-07-31

## 2018-07-30 RX ORDER — ACETAMINOPHEN 325 MG/1
650 TABLET ORAL EVERY 4 HOURS PRN
Status: DISCONTINUED | OUTPATIENT
Start: 2018-07-30 | End: 2018-07-31

## 2018-07-30 RX ORDER — TIZANIDINE 4 MG/1
4 TABLET ORAL EVERY 8 HOURS PRN
Status: DISCONTINUED | OUTPATIENT
Start: 2018-07-30 | End: 2018-07-31

## 2018-07-30 RX ORDER — SODIUM PHOSPHATE, DIBASIC AND SODIUM PHOSPHATE, MONOBASIC 7; 19 G/133ML; G/133ML
1 ENEMA RECTAL ONCE AS NEEDED
Status: DISCONTINUED | OUTPATIENT
Start: 2018-07-30 | End: 2018-07-31

## 2018-07-30 RX ORDER — ACETAMINOPHEN 650 MG/1
650 SUPPOSITORY RECTAL EVERY 4 HOURS PRN
Status: DISCONTINUED | OUTPATIENT
Start: 2018-07-30 | End: 2018-07-31

## 2018-07-30 RX ORDER — MORPHINE SULFATE 2 MG/ML
1 INJECTION, SOLUTION INTRAMUSCULAR; INTRAVENOUS EVERY 2 HOUR PRN
Status: DISCONTINUED | OUTPATIENT
Start: 2018-07-30 | End: 2018-07-31

## 2018-07-30 RX ORDER — SENNOSIDES 8.6 MG
17.2 TABLET ORAL NIGHTLY
Status: DISCONTINUED | OUTPATIENT
Start: 2018-07-30 | End: 2018-07-31

## 2018-07-30 RX ORDER — MONTELUKAST SODIUM 10 MG/1
10 TABLET ORAL NIGHTLY
Status: DISCONTINUED | OUTPATIENT
Start: 2018-07-30 | End: 2018-07-31

## 2018-07-30 RX ORDER — CLONAZEPAM 0.5 MG/1
0.5 TABLET ORAL NIGHTLY PRN
Status: DISCONTINUED | OUTPATIENT
Start: 2018-07-30 | End: 2018-07-31

## 2018-07-30 NOTE — ED INITIAL ASSESSMENT (HPI)
Patient presents with c/o right sided chest pain and right arm weakness and heaviness intermittent since 1am.

## 2018-07-30 NOTE — ED PROVIDER NOTES
Patient Seen in: [facility] AND [facility] Emergency Department    History   Patient presents with:  Chest Pain Angina (cardiovascular)    Stated Complaint:     HPI    Patient presents to the emergency department with complaint of trouble using her right hand. [phone]. She is a special needs son who is in a wheelchair. She needs to push him around and also lift the wheelchair which aggravates her pain in her neck and trapezius muscle.    Montelukast Sodium 10 MG Oral Tab,     Albuterol Sulfate  (9 Alert, well nourished adult lying in bed in no distress. Vital signs noted. Eye:  No scleral icterus. Eyelids appear normal, no lesions. Cardiovascular:  Normal S1 and S2, no murmur, regular, with good peripheral perfusion.   Respiratory:  Lungs clear normal limits   TROPONIN I - Normal   CBC WITH DIFFERENTIAL WITH PLATELET    Narrative: The following orders were created for panel order CBC WITH DIFFERENTIAL WITH PLATELET.   Procedure                               Abnormality         Status

## 2018-07-30 NOTE — ED PROVIDER NOTES
Patient Seen in: [facility] AND CLINICS Immediate Care In [address] [place] [address]    History   Patient presents with:  Chest Pain Angina (cardiovascular)  Numbness Weakness (neurologic)    Stated Complaint: rt arm weakness/chest pain    HPI    The patient is a 26-year-old f Packs/day: 0.00      Years: 10.00        Types: Cigarettes     Quit date: 1/1/2010  Smokeless tobacco: Former User                     Alcohol use: Yes           0.0 oz/week     Comment: WEEKENDS      Review of Systems    Positive for stated complaint: rt intervals are normal.  There are nonspecific ST-T wave changes.            ED Course as of Jul 30 1044  ------------------------------------------------------------  Due to the patient's ongoing chest pain and objective right-sided weakness, recommend EMS t

## 2018-07-30 NOTE — ED NOTES
[facility] paramedics arrived. Patient will be transported to [facility] ER for further evaluation.

## 2018-07-30 NOTE — PLAN OF CARE
Problem: Patient Centered Care  Goal: Patient preferences are identified and integrated in the patient's plan of care  Interventions:  - What would you like us to know as we care for you? My son is in a wheelchair and I take care of him.  My daughter is jayant Daily Living  Goal: Achieve highest/safest level of independence in self care  Interventions:  - Assess ability and encourage patient to participate in ADLs to maximize function  - Promote sitting position while performing ADLs such as feeding, grooming, a including physical limitations  - Instruct pt to call for assistance with activity based on assessment  - Modify environment to reduce risk of injury  - Provide assistive devices as appropriate  - Consider OT/PT consult to assist with strengthening/mobilit

## 2018-07-30 NOTE — SLP NOTE
RN reports pt tolerating a cardiac diet with no clinical signs of aspiration. Sw eval and s/l evaluation pending MRI results. Thank you.     Brandy Delgado M.S. [id number] [facility]  Speech Language Pathologist   Phone Number [phone]

## 2018-07-30 NOTE — ED INITIAL ASSESSMENT (HPI)
Pt came in via EMS from [facility] for right sided CP since 0100 last night and right hand weakness. Able to complete ROM with hand, strength equal on both sides. No drift. Denies SOB, cough, fever, HA. RR even and nonlabored, speaking in full sentences.  Given 4

## 2018-07-30 NOTE — H&P
[facility]    PATIENT'S NAME: Marycruz Skaggs   ATTENDING PHYSICIAN: Jason Becerra MD   PATIENT ACCOUNT#:   [id number]    LOCATION:  [place] [address]  MEDICAL RECORD #:   [id number]       YOB: 1965  ADMISSION DATE:       07/30/20 tendency to dropping objects. Symptoms started last night. The patient denies any trauma to her neck recently. No gait imbalance. No diplopia. No dizziness. No chest pain. No shortness of breath. Other 12-point review of systems negative.       PHYS

## 2018-07-30 NOTE — CONSULTS
Neurology Inpatient Consult Note    Audra Skaggs : 1965   Referring Physician: Dr. Nohemy Zapata  HPI:     Audra Skaggs is a 45year old female who is being seen in neurologic evaluation.     Patient is being seen in evaluation for right arm weaknes Number of children:               Social History Main Topics    Smoking status: Former Smoker                                                                Packs/day: 0.00      Years: 10.00          Types: Cigarettes       Quit date: 1/1/20 finger-nose-finger dysmetria, gait testing deferred    IMAGING / STUDIES / LABS:  reviewed   CT head wo  CONCLUSION:   1. Negative noncontrast CT brain. No significant change. 2. No acute intracranial hemorrhage.       CUS  CONCLUSION: Normal duplex caroti

## 2018-07-30 NOTE — ED NOTES
Patient presents with c/o right sided chest pain which she described as a sharp tightness that started last night at approx 1 am which watching TV. Patient also began to have right arm weakness and pain at that time as well. Patient is aox4, speech clear.

## 2018-07-31 ENCOUNTER — APPOINTMENT (OUTPATIENT)
Dept: CV DIAGNOSTICS | Facility: HOSPITAL | Age: 53
End: 2018-07-31
Attending: HOSPITALIST
Payer: MEDICARE

## 2018-07-31 VITALS
WEIGHT: 120 LBS | TEMPERATURE: 98 F | SYSTOLIC BLOOD PRESSURE: 122 MMHG | DIASTOLIC BLOOD PRESSURE: 69 MMHG | HEIGHT: 61 IN | BODY MASS INDEX: 22.66 KG/M2 | HEART RATE: 64 BPM | OXYGEN SATURATION: 100 % | RESPIRATION RATE: 18 BRPM

## 2018-07-31 LAB — POTASSIUM SERPL-SCNC: 4.8 MMOL/L (ref 3.3–5.1)

## 2018-07-31 PROCEDURE — 99217 OBSERVATION CARE DISCHARGE: CPT | Performed by: HOSPITALIST

## 2018-07-31 PROCEDURE — 99213 OFFICE O/P EST LOW 20 MIN: CPT | Performed by: OTHER

## 2018-07-31 NOTE — PROGRESS NOTES
Pt discharged, James Perdomo provided transportation home. Discharge paperwork and prescriptions reviewed, pt verbalized understanding. All questions and concerns addressed. IV access and tele discontinued.

## 2018-07-31 NOTE — CM/SW NOTE
Sw was notified that pt is able to d/c today and requesting [facility] set up. SW met w/ pt; agreeable w/ [facility]. Pt reported that she lives at home w/ her son, who she is primary caregiver for as he is w/c bound and was born w/ disability.  Pt reported be

## 2018-07-31 NOTE — DISCHARGE SUMMARY
[facility]    Discharge Summary    Altamese Venu Patient Status:  Observation    1965 MRN [id number]   Location [facility] 5SW/SE Attending Zheng Doyle MD   Hosp Day # 0 PCP Adal Krueger DO     Date of Admission: II-XII      History of Present Illness:   Per Dr. Noemy Santoyo  The patient is a 53-year-old  female with known past medical history of degenerative joint disease of cervical spine, yesterday started having tingling and numbness from her right MCG/ACT Aers      Inhale 2 puffs into the lungs every 6 (six) hours as needed for Wheezing. Refills:  0     ClonazePAM 0.5 MG Tabs  Commonly known as:  KLONOPIN      Take 1 tablet (0.5 mg total) by mouth nightly as needed for Anxiety (sleep and anxiety).

## 2018-07-31 NOTE — PROGRESS NOTES
[facility]  Report of Consultation    Iglesias Primus Patient Status:  Observation    1965 MRN [id number]   Location [facility] 5SW/SE Attending Norma Ling MD   Hosp Day # 0 [facility] Sandra Min, DO     Date of Admission per NextGen:  \"CVA (Stroke), (cause of death)\"   • Cataracts Father    • Glaucoma Father    • Myocardial infarction [OTHER] Father      per NextGen   • No Known Problems Daughter       reports that she quit smoking about 8 years ago.  Her smoking use incl HPI.    Physical Exam:  Blood pressure 122/69, pulse 64, temperature 97.7 °F (36.5 °C), temperature source Oral, resp. rate 18, height 5' 1\" (1.549 m), weight 120 lb (54.4 kg), SpO2 100 %, not currently breastfeeding.     General: Alert and oriented x3, NA tissue. BONES:             No acute fracture or malalignment. There is straightening of the normal cervical lordosis that is likely related to positioning. CORD:  No abnormal cord signal or enhancement. OTHER:             Negative.      CERVICAL DISC LEV patient      Thank you for allowing me to participate in the care of your patient. Comprehensive analgesic plan was formulated. Conservative vs. Aggressive measures were discussed at length including pharmacotherapy (eg.  Anti- inflammatories, muscle

## 2018-07-31 NOTE — PLAN OF CARE
Problem: Patient Centered Care  Goal: Patient preferences are identified and integrated in the patient's plan of care  Interventions:  - What would you like us to know as we care for you? My son is in a wheelchair and I take care of him.  My daughter is jayant Daily Living  Goal: Achieve highest/safest level of independence in self care  Interventions:  - Assess ability and encourage patient to participate in ADLs to maximize function  - Promote sitting position while performing ADLs such as feeding, grooming, a assessment  - Modify environment to reduce risk of injury  - Provide assistive devices as appropriate  - Consider OT/PT consult to assist with strengthening/mobility  - Encourage toileting schedule   Outcome: Progressing      Problem: DISCHARGE PLANNING  G

## 2018-07-31 NOTE — PROGRESS NOTES
Neurology Inpatient Follow-up Note      HPI:     Patient is being seen in follow-up. She states she is feeling much better today. Interval workup reviewed.       Past Medical Hisotory:  Reviewed    Medications:  Reviewed    Allergies:  No Known Allergies spondylosis most pronounced at C4-C5 and C5-C6. Mildly prominent adenoid tonsillar tissue, possibly reactive. Similar findings were present on prior study from 2016. MRI brain  CONCLUSION:      No acute intracranial abnormality.  Specifically, no a

## 2018-08-01 ENCOUNTER — PATIENT OUTREACH (OUTPATIENT)
Dept: CASE MANAGEMENT | Age: 53
End: 2018-08-01

## 2018-08-01 NOTE — PROGRESS NOTES
Initial Post Discharge Follow Up   Discharge Date: 7/31/18  Contact Date: 8/1/2018    Consent Verification:  Assessment Completed With: Patient  HIPAA Verified?   Yes    Discharge Dx:  R arm weakness, cervical radiculopathy, cervical spine spondylosis [phone]. She is a special needs son who is in a wheelchair. She needs to push him around and also lift the wheelchair which aggravates her pain in her neck and trapezius muscle.  Disp: 1 Device Rfl: 0   Montelukast Sodium 10 MG Oral Tab Take 10 mg by m is no need to print the assessment, just complete online and the therapist will have your results when you come in for your evaluation.        Aug 20, 2018  1:45 PM CDT [facility] Visit By Therapist with Adeline Dooley PTA [facility]  Rehab Ser vickey B[facility]  Rehab Services in [address]  [address] [facility] [address]  [phone]          PCP TCM/HFU appointment: scheduled at D/C within 7-14 days  no     NCM Reviewed/scheduled/rescheduled PCP TCM/HFU

## 2018-08-06 ENCOUNTER — OFFICE VISIT (OUTPATIENT)
Dept: FAMILY MEDICINE CLINIC | Facility: CLINIC | Age: 53
End: 2018-08-06
Payer: MEDICARE

## 2018-08-06 VITALS
HEART RATE: 64 BPM | SYSTOLIC BLOOD PRESSURE: 137 MMHG | WEIGHT: 119 LBS | DIASTOLIC BLOOD PRESSURE: 82 MMHG | BODY MASS INDEX: 22.47 KG/M2 | TEMPERATURE: 99 F | HEIGHT: 61 IN

## 2018-08-06 DIAGNOSIS — M54.12 CERVICAL RADICULOPATHY: ICD-10-CM

## 2018-08-06 DIAGNOSIS — R20.2 RIGHT HAND PARESTHESIA: ICD-10-CM

## 2018-08-06 DIAGNOSIS — R29.898 WEAKNESS OF RIGHT UPPER EXTREMITY: Primary | ICD-10-CM

## 2018-08-06 DIAGNOSIS — F51.04 PSYCHOPHYSIOLOGICAL INSOMNIA: ICD-10-CM

## 2018-08-06 DIAGNOSIS — M54.2 NECK PAIN, BILATERAL POSTERIOR: ICD-10-CM

## 2018-08-06 DIAGNOSIS — F41.9 ANXIETY: ICD-10-CM

## 2018-08-06 PROCEDURE — 1111F DSCHRG MED/CURRENT MED MERGE: CPT | Performed by: FAMILY MEDICINE

## 2018-08-06 PROCEDURE — 99495 TRANSJ CARE MGMT MOD F2F 14D: CPT | Performed by: FAMILY MEDICINE

## 2018-08-06 RX ORDER — CLONAZEPAM 0.5 MG/1
0.5 TABLET ORAL NIGHTLY PRN
Qty: 30 TABLET | Refills: 1 | Status: SHIPPED | OUTPATIENT
Start: 2018-08-06 | End: 2018-11-25

## 2018-08-06 NOTE — PROGRESS NOTES
HPI:      Discharge Summaries  Date of Service: 2018 10:53 AM  Loan Alfaro MD   [address]     Discharge Summary           Francisco Leonard Patient Status:  Observation    1965 MRN [id number]   [place] NEUROLOG[facility]:  Right hand  with weakness improved.  No other muscle group weakness noted on right upper extremity.  Other motor and sensory examination normal. Cranial nerves intact II-XII        History of Present Illness:   Per Dr. Ruby Perez  The patien   TAKE 1 TABLET(4 MG) BY MOUTH EVERY 8 HOURS AS NEEDED    Quantity:  270 tablet  Refills:  0                      CONTINUE taking these medications      Instructions Prescription details   Albuterol Sulfate  (90 Base) MCG/ACT Aers       Inhale 2 puf Hospital Discharge Diagnoses (since 7/7/2018)     R arm weakness, cervical radiculopathy, cervical spine spondylosis      Interactive contact within 2 business days post discharge first initiated on Date: 8/1/2018    During the visit, the following was com TECHNIQUE:    A variety of imaging planes and parameters were utilized for visualization of suspected pathology prior to and after intravenous gadolinium injection.           FINDINGS:          Motion artifact is present on multiple pulse sequences, limitin Nabumetone 750 MG Oral Tab TAKE 1 TABLET(750 MG) BY MOUTH TWICE DAILY   ClonazePAM (KLONOPIN) 0.5 MG Oral Tab Take 1 tablet (0.5 mg total) by mouth nightly as needed for Anxiety (sleep and anxiety).    Nerve Stimulator (STANDARD TENS) Does not apply Device PHYSICAL EXAM:   No LMP recorded. Patient is postmenopausal. Estimated body mass index is 22.48 kg/m² as calculated from the following:    Height as of this encounter: 5' 1\" (1.549 m). Weight as of this encounter: 119 lb (54 kg).    /82   Pulse 64 -     EMG ([facility] [place])    Anxiety  -     ClonazePAM (KLONOPIN) 0.5 MG Oral Tab; Take 1 tablet (0.5 mg total) by mouth nightly as needed for Anxiety (sleep and anxiety).   Refill of the Klonopin  Psychophysiological insomnia  -     C

## 2018-08-15 ENCOUNTER — TELEPHONE (OUTPATIENT)
Dept: PHYSICAL THERAPY | Age: 53
End: 2018-08-15

## 2018-08-15 ENCOUNTER — APPOINTMENT (OUTPATIENT)
Dept: PHYSICAL THERAPY | Age: 53
End: 2018-08-15
Attending: FAMILY MEDICINE
Payer: MEDICARE

## 2018-08-20 ENCOUNTER — APPOINTMENT (OUTPATIENT)
Dept: PHYSICAL THERAPY | Age: 53
End: 2018-08-20
Attending: FAMILY MEDICINE
Payer: MEDICARE

## 2018-08-22 ENCOUNTER — APPOINTMENT (OUTPATIENT)
Dept: PHYSICAL THERAPY | Age: 53
End: 2018-08-22
Attending: FAMILY MEDICINE
Payer: MEDICARE

## 2018-08-27 ENCOUNTER — APPOINTMENT (OUTPATIENT)
Dept: PHYSICAL THERAPY | Age: 53
End: 2018-08-27
Attending: FAMILY MEDICINE
Payer: MEDICARE

## 2018-08-29 ENCOUNTER — APPOINTMENT (OUTPATIENT)
Dept: PHYSICAL THERAPY | Age: 53
End: 2018-08-29
Attending: FAMILY MEDICINE
Payer: MEDICARE

## 2018-09-04 ENCOUNTER — APPOINTMENT (OUTPATIENT)
Dept: PHYSICAL THERAPY | Age: 53
End: 2018-09-04
Attending: FAMILY MEDICINE
Payer: MEDICARE

## 2018-09-06 ENCOUNTER — APPOINTMENT (OUTPATIENT)
Dept: PHYSICAL THERAPY | Age: 53
End: 2018-09-06
Attending: FAMILY MEDICINE
Payer: MEDICARE

## 2018-09-10 ENCOUNTER — APPOINTMENT (OUTPATIENT)
Dept: PHYSICAL THERAPY | Age: 53
End: 2018-09-10
Attending: FAMILY MEDICINE
Payer: MEDICARE

## 2018-11-25 DIAGNOSIS — F51.04 PSYCHOPHYSIOLOGICAL INSOMNIA: ICD-10-CM

## 2018-11-25 DIAGNOSIS — F41.9 ANXIETY: ICD-10-CM

## 2018-11-26 RX ORDER — TIZANIDINE 4 MG/1
TABLET ORAL
Qty: 90 TABLET | Refills: 0 | Status: SHIPPED | OUTPATIENT
Start: 2018-11-26 | End: 2021-02-05

## 2018-11-26 RX ORDER — CLONAZEPAM 0.5 MG/1
TABLET ORAL
Qty: 30 TABLET | Refills: 0 | Status: SHIPPED
Start: 2018-11-26 | End: 2021-02-05

## 2018-11-26 RX ORDER — DOXEPIN HYDROCHLORIDE 25 MG/1
CAPSULE ORAL
Qty: 30 CAPSULE | Refills: 0 | Status: SHIPPED | OUTPATIENT
Start: 2018-11-26 | End: 2021-02-05

## 2018-11-26 NOTE — TELEPHONE ENCOUNTER
Called [facility] pharmacy in [address] and left voicemail with rx approval for Clonazepam 0.5mg #30.

## 2018-11-26 NOTE — TELEPHONE ENCOUNTER
Requested Prescriptions     Pending Prescriptions Disp Refills   • TIZANIDINE HCL 4 MG Oral Tab [Pharmacy Med Name: TIZANIDINE 4MG TABLETS] 90 tablet 0     Sig: TAKE 1 TABLET(4 MG) BY MOUTH EVERY 8 HOURS AS NEEDED       Last Office Visit with PCP: Visit da

## 2018-11-26 NOTE — TELEPHONE ENCOUNTER
Requested Prescriptions     Pending Prescriptions Disp Refills   • CLONAZEPAM 0.5 MG Oral Tab [Pharmacy Med Name: CLONAZEPAM 0.5MG TABLETS] 30 tablet 0     Sig: TAKE 1 TABLET BY MOUTH EVERY NIGHT AS NEEDED FOR ANXIETY( SLEEP)   • DOXEPIN HCL 25 MG Oral Cap

## 2020-10-31 ENCOUNTER — HOSPITAL ENCOUNTER (EMERGENCY)
Facility: HOSPITAL | Age: 55
Discharge: HOME | End: 2020-10-31
Attending: EMERGENCY MEDICINE
Payer: MEDICARE

## 2020-10-31 VITALS
TEMPERATURE: 98 F | RESPIRATION RATE: 18 BRPM | SYSTOLIC BLOOD PRESSURE: 119 MMHG | DIASTOLIC BLOOD PRESSURE: 75 MMHG | OXYGEN SATURATION: 97 % | HEART RATE: 72 BPM

## 2020-10-31 DIAGNOSIS — S39.012A STRAIN OF LUMBAR REGION, INITIAL ENCOUNTER: Primary | ICD-10-CM

## 2020-10-31 PROCEDURE — 81003 URINALYSIS AUTO W/O SCOPE: CPT | Performed by: EMERGENCY MEDICINE

## 2020-10-31 PROCEDURE — 99283 EMERGENCY DEPT VISIT LOW MDM: CPT

## 2020-10-31 RX ORDER — ALBUTEROL SULFATE 90 UG/1
2 AEROSOL, METERED RESPIRATORY (INHALATION) EVERY 4 HOURS PRN
Qty: 1 INHALER | Refills: 0 | Status: SHIPPED | OUTPATIENT
Start: 2020-10-31 | End: 2020-11-30

## 2020-10-31 RX ORDER — ACETAMINOPHEN 500 MG
1000 TABLET ORAL ONCE
Status: COMPLETED | OUTPATIENT
Start: 2020-10-31 | End: 2020-10-31

## 2020-10-31 RX ORDER — PREDNISONE 20 MG/1
40 TABLET ORAL ONCE
Status: COMPLETED | OUTPATIENT
Start: 2020-10-31 | End: 2020-10-31

## 2020-10-31 RX ORDER — PREDNISONE 20 MG/1
40 TABLET ORAL DAILY
Qty: 6 TABLET | Refills: 0 | Status: SHIPPED | OUTPATIENT
Start: 2020-10-31 | End: 2020-11-03

## 2020-10-31 RX ORDER — IBUPROFEN 600 MG/1
600 TABLET ORAL ONCE
Status: COMPLETED | OUTPATIENT
Start: 2020-10-31 | End: 2020-10-31

## 2020-10-31 RX ORDER — CYCLOBENZAPRINE HCL 10 MG
10 TABLET ORAL 3 TIMES DAILY PRN
Qty: 12 TABLET | Refills: 0 | Status: SHIPPED | OUTPATIENT
Start: 2020-10-31 | End: 2021-02-05

## 2020-10-31 RX ORDER — KETOROLAC TROMETHAMINE 10 MG/1
10 TABLET, FILM COATED ORAL EVERY 6 HOURS PRN
Qty: 15 TABLET | Refills: 0 | Status: SHIPPED | OUTPATIENT
Start: 2020-10-31 | End: 2020-11-07

## 2020-10-31 NOTE — ED INITIAL ASSESSMENT (HPI)
Patient presents with left lower back pain. Patient believes she may have strained it picking up a laundry basket.

## 2020-10-31 NOTE — ED PROVIDER NOTES
Patient Seen in: [facility] AND [facility] Emergency Department    History   Patient presents with:  Back Pain    Stated Complaint: Back pain    HPI    Chief complaint: Back pain    History of present illness:   The patient complains of back pain, that began few da TAKE 1 CAPSULE BY MOUTH EVERY NIGHT   TiZANidine HCl 4 MG Oral Tab,  TAKE 1 TABLET(4 MG) BY MOUTH EVERY 8 HOURS AS NEEDED  Patient taking differently: Take 4 mg by mouth every 8 (eight) hours as needed (muscle spasms).      Nabumetone 750 MG Oral Tab,  TAKE HPI.  Constitutional and vital signs reviewed. All other systems reviewed and negative except as noted above. PSFH elements reviewed from today and agreed except as otherwise stated in HPI.     Physical Exam     ED Triage Vitals [10/31/20 1336]   BP MG Oral Tab  Take 1 tablet (10 mg total) by mouth 3 (three) times daily as needed for Muscle spasms. Qty: 12 tablet Refills: 0    Ketorolac Tromethamine 10 MG Oral Tab  Take 1 tablet (10 mg total) by mouth every 6 (six) hours as needed for Pain.   Qty: 15

## 2021-02-05 ENCOUNTER — OFFICE VISIT (OUTPATIENT)
Dept: FAMILY MEDICINE CLINIC | Facility: CLINIC | Age: 56
End: 2021-02-05
Payer: MEDICARE

## 2021-02-05 ENCOUNTER — HOSPITAL ENCOUNTER (OUTPATIENT)
Dept: GENERAL RADIOLOGY | Age: 56
Discharge: HOME | End: 2021-02-05
Attending: FAMILY MEDICINE
Payer: MEDICARE

## 2021-02-05 VITALS
DIASTOLIC BLOOD PRESSURE: 76 MMHG | WEIGHT: 127 LBS | HEART RATE: 80 BPM | BODY MASS INDEX: 23.98 KG/M2 | SYSTOLIC BLOOD PRESSURE: 127 MMHG | TEMPERATURE: 98 F | HEIGHT: 61 IN

## 2021-02-05 DIAGNOSIS — M47.817 SPONDYLOSIS OF LUMBOSACRAL SPINE WITHOUT MYELOPATHY: ICD-10-CM

## 2021-02-05 DIAGNOSIS — R22.32 MASS OF FINGER OF LEFT HAND: ICD-10-CM

## 2021-02-05 DIAGNOSIS — M54.50 CHRONIC BILATERAL LOW BACK PAIN WITHOUT SCIATICA: Primary | ICD-10-CM

## 2021-02-05 DIAGNOSIS — G89.29 CHRONIC BILATERAL LOW BACK PAIN WITHOUT SCIATICA: ICD-10-CM

## 2021-02-05 DIAGNOSIS — M62.838 NECK MUSCLE SPASM: ICD-10-CM

## 2021-02-05 DIAGNOSIS — S00.83XA CONTUSION OF OTHER PART OF HEAD, INITIAL ENCOUNTER: ICD-10-CM

## 2021-02-05 DIAGNOSIS — M62.838 TRAPEZIUS MUSCLE SPASM: ICD-10-CM

## 2021-02-05 DIAGNOSIS — G89.29 CHRONIC BILATERAL LOW BACK PAIN WITHOUT SCIATICA: Primary | ICD-10-CM

## 2021-02-05 DIAGNOSIS — M67.90 NODULE OF FLEXOR TENDON SHEATH: ICD-10-CM

## 2021-02-05 DIAGNOSIS — M50.30 DDD (DEGENERATIVE DISC DISEASE), CERVICAL: ICD-10-CM

## 2021-02-05 DIAGNOSIS — M54.50 CHRONIC BILATERAL LOW BACK PAIN WITHOUT SCIATICA: ICD-10-CM

## 2021-02-05 PROCEDURE — 99214 OFFICE O/P EST MOD 30 MIN: CPT | Performed by: FAMILY MEDICINE

## 2021-02-05 PROCEDURE — 72110 X-RAY EXAM L-2 SPINE 4/>VWS: CPT | Performed by: FAMILY MEDICINE

## 2021-02-05 RX ORDER — NABUMETONE 750 MG/1
TABLET, FILM COATED ORAL
Qty: 180 TABLET | Refills: 0 | Status: SHIPPED | OUTPATIENT
Start: 2021-02-05 | End: 2021-03-11

## 2021-02-05 RX ORDER — CYCLOBENZAPRINE HCL 10 MG
10 TABLET ORAL NIGHTLY
Qty: 30 TABLET | Refills: 1 | Status: SHIPPED | OUTPATIENT
Start: 2021-02-05 | End: 2021-03-11

## 2021-02-05 NOTE — PROGRESS NOTES
Patient ID: Brennen Roland is a 60year old female. HPI  Patient presents with:  Low Back Pain    Last seen by me on 8/6/2018. Pt has a 25year old son with special needs who uses a wheelchair. She works at [employer].     Pt c/o constant pain in her lower ba INJECTION N/A 5/15/2018    Performed by Ozzy Matt MD at [address] [facility] MAIN OR   • CERVICAL EPIDURAL STEROID INJECTION N/A 8/18/2017    Performed by Radha Sanchez MD at [address] [place] [address] OR   • CHOLECYSTECTOMY  2006   • ELECTROCARDIOGRAM, COMPLETE  01-    Scan bilaterally    ----------------------------------------------------------------    Vitals reviewed.            Assessment/Plan:      Diagnoses and all orders for this visit:    Chronic bilateral low back pain without sciatica  -     cyclobenzaprine 10 MG Or Requested Specialty:SURGERY, PLASTIC          Number of Visits Requested:3      Follow up if symptoms persist.  Take medicine (if given) as prescribed. Approach to treatment discussed and patient/family member understands and agrees to plan.      No fo

## 2021-03-11 ENCOUNTER — OFFICE VISIT (OUTPATIENT)
Dept: FAMILY MEDICINE CLINIC | Facility: CLINIC | Age: 56
End: 2021-03-11
Payer: MEDICARE

## 2021-03-11 VITALS
WEIGHT: 122.19 LBS | SYSTOLIC BLOOD PRESSURE: 128 MMHG | HEART RATE: 87 BPM | BODY MASS INDEX: 23.07 KG/M2 | HEIGHT: 61 IN | TEMPERATURE: 99 F | DIASTOLIC BLOOD PRESSURE: 82 MMHG

## 2021-03-11 DIAGNOSIS — Z87.891 PERSONAL HISTORY OF TOBACCO USE, PRESENTING HAZARDS TO HEALTH: ICD-10-CM

## 2021-03-11 DIAGNOSIS — Z11.4 SCREENING FOR HIV (HUMAN IMMUNODEFICIENCY VIRUS): ICD-10-CM

## 2021-03-11 DIAGNOSIS — Z12.31 VISIT FOR SCREENING MAMMOGRAM: ICD-10-CM

## 2021-03-11 DIAGNOSIS — F17.210 CIGARETTE SMOKER: ICD-10-CM

## 2021-03-11 DIAGNOSIS — Z12.11 SCREENING FOR COLON CANCER: ICD-10-CM

## 2021-03-11 DIAGNOSIS — M79.672 LEFT FOOT PAIN: ICD-10-CM

## 2021-03-11 DIAGNOSIS — J44.9 ASTHMA WITH COPD (CHRONIC OBSTRUCTIVE PULMONARY DISEASE) (HCC): ICD-10-CM

## 2021-03-11 DIAGNOSIS — Z13.220 ENCOUNTER FOR SCREENING FOR LIPID DISORDER: ICD-10-CM

## 2021-03-11 DIAGNOSIS — Z00.00 ADULT GENERAL MEDICAL EXAM: Primary | ICD-10-CM

## 2021-03-11 DIAGNOSIS — Z13.1 ENCOUNTER FOR SCREENING EXAMINATION FOR IMPAIRED GLUCOSE REGULATION AND DIABETES MELLITUS: ICD-10-CM

## 2021-03-11 DIAGNOSIS — Z00.00 ENCOUNTER FOR ANNUAL HEALTH EXAMINATION: ICD-10-CM

## 2021-03-11 DIAGNOSIS — R22.32 MASS OF FINGER OF LEFT HAND: ICD-10-CM

## 2021-03-11 DIAGNOSIS — Z12.4 CERVICAL CANCER SCREENING: ICD-10-CM

## 2021-03-11 PROCEDURE — 99406 BEHAV CHNG SMOKING 3-10 MIN: CPT | Performed by: FAMILY MEDICINE

## 2021-03-11 PROCEDURE — G0439 PPPS, SUBSEQ VISIT: HCPCS | Performed by: FAMILY MEDICINE

## 2021-03-11 PROCEDURE — 99213 OFFICE O/P EST LOW 20 MIN: CPT | Performed by: FAMILY MEDICINE

## 2021-03-11 RX ORDER — NICOTINE 21 MG/24HR
1 PATCH, TRANSDERMAL 24 HOURS TRANSDERMAL EVERY 24 HOURS
Qty: 28 PATCH | Refills: 2 | Status: SHIPPED | OUTPATIENT
Start: 2021-03-11

## 2021-03-11 NOTE — PROGRESS NOTES
HPI:   Michel Jeronimo is a 60year old female who presents for a Medicare Initial Preventative Physical Exam (Welcome to Medicare- < 12 months on Medicare).     Her last annual assessment has been over 1 year: Annual Physical due on 01/17/2019       Last se planning including the explanation and discussion of advance directives standard forms performed Face to Face with patient and Family/surrogate (if present), and forms available to patient in AVS     She does NOT have a Power of  for [employer] on oz  02/05/21 : 127 lb  08/06/18 : 119 lb     Last Cholesterol Labs:   Lab Results   Component Value Date    CHOLEST 154 07/30/2018    HDL 74 07/30/2018    LDL 67 07/30/2018    TRIG 66 07/30/2018          Last Chemistry Labs:   Lab Results   Component Value Knot on left third finger  Pain in left foot        EXAM:   /82   Pulse 87   Temp 98.5 °F (36.9 °C) (Temporal)   Ht 5' 1\" (1.549 m)   Wt 122 lb 3.2 oz   BMI 23.09 kg/m²  Estimated body mass index is 23.09 kg/m² as calculated from the following: place, and time. Patient appears well-developed and well-nourished. No distress. Head: Normocephalic. Eyes: Conjunctivae and EOM are normal.   Cardiovascular: Normal rate, regular rhythm and normal heart sounds.     Pulmonary/Chest: Effort normal and br 1 patch onto the skin daily.  -     HIV AG AB COMBO; Future  -     BEHAV CHNG SMOKING GR THAN 3 UP TO 10 MIN    Personal history of tobacco use, presenting hazards to health  -     HIV AG AB COMBO; Future  -     BEHAV CHNG SMOKING GR THAN 3 UP TO 10 MIN visit. No flowsheet data found. Fecal Occult Blood Annually No results found for: FOB No flowsheet data found. Glaucoma Screening      Ophthalmology Visit Annually: Diabetics, FHx Glaucoma, AA>50, > 65 No flowsheet data found.     Bone Densit COPD      Spirometry Testing Annually Spirometry date:  No flowsheet data found.               By signing my name below, Contreras Farmer,  attest that this documentation has been prepared under the direction and in the presence of Hien Nelson

## 2021-03-11 NOTE — PROGRESS NOTES
Tobacco Cessation Documentation (Smoking and Smokeless included): I had an in depth therapy session with Zen Tucker about her tobacco use risks and options using the USPSTF's Five A's approach:    Ask: Derryl Dandy is using tobacco products. Assess:  We

## 2021-03-11 NOTE — PATIENT INSTRUCTIONS
With Medicare you to get the shingles vaccine at your pharmacy.   The shingles vaccine is called Shingrix.    ========================================================================    Julian Mohamud's SCREENING SCHEDULE   Tests on this list are recommende to patients who meet one of the following criteria:   • Men who are 73-68 years old and have smoked more than 100 cigarettes in their lifetime   • Anyone with a family history    Colorectal Cancer Screening  Covered up to Age 76     Colonoscopy Screen   Co any previous visit. Please get every year    Pneumococcal 13 (Prevnar)  Covered Once after 65 No orders found for this or any previous visit.  Please get once after your 65th birthday    Pneumococcal 23 (Pneumovax)  Covered Once after 65 Orders placed or pe

## 2021-03-15 ENCOUNTER — LAB ENCOUNTER (OUTPATIENT)
Dept: LAB | Age: 56
End: 2021-03-15
Attending: FAMILY MEDICINE
Payer: MEDICARE

## 2021-03-15 DIAGNOSIS — Z13.1 ENCOUNTER FOR SCREENING EXAMINATION FOR IMPAIRED GLUCOSE REGULATION AND DIABETES MELLITUS: ICD-10-CM

## 2021-03-15 DIAGNOSIS — Z87.891 PERSONAL HISTORY OF TOBACCO USE, PRESENTING HAZARDS TO HEALTH: ICD-10-CM

## 2021-03-15 DIAGNOSIS — Z13.220 ENCOUNTER FOR SCREENING FOR LIPID DISORDER: ICD-10-CM

## 2021-03-15 DIAGNOSIS — J44.9 ASTHMA WITH COPD (CHRONIC OBSTRUCTIVE PULMONARY DISEASE) (HCC): ICD-10-CM

## 2021-03-15 DIAGNOSIS — Z11.4 SCREENING FOR HIV (HUMAN IMMUNODEFICIENCY VIRUS): ICD-10-CM

## 2021-03-15 DIAGNOSIS — F17.210 CIGARETTE SMOKER: ICD-10-CM

## 2021-03-15 LAB
ALBUMIN SERPL-MCNC: 3.8 G/DL (ref 3.4–5)
ALBUMIN/GLOB SERPL: 1.1 {RATIO} (ref 1–2)
ALP LIVER SERPL-CCNC: 87 U/L
ALT SERPL-CCNC: 36 U/L
ANION GAP SERPL CALC-SCNC: 9 MMOL/L (ref 0–18)
AST SERPL-CCNC: 27 U/L (ref 15–37)
BASOPHILS # BLD AUTO: 0.06 X10(3) UL (ref 0–0.2)
BASOPHILS NFR BLD AUTO: 1.1 %
BILIRUB SERPL-MCNC: 0.9 MG/DL (ref 0.1–2)
BUN BLD-MCNC: 10 MG/DL (ref 7–18)
BUN/CREAT SERPL: 13.3 (ref 10–20)
CALCIUM BLD-MCNC: 9.1 MG/DL (ref 8.5–10.1)
CHLORIDE SERPL-SCNC: 108 MMOL/L (ref 98–112)
CHOLEST SMN-MCNC: 141 MG/DL (ref ?–200)
CO2 SERPL-SCNC: 24 MMOL/L (ref 21–32)
CREAT BLD-MCNC: 0.75 MG/DL
DEPRECATED RDW RBC AUTO: 43.8 FL (ref 35.1–46.3)
EOSINOPHIL # BLD AUTO: 0.11 X10(3) UL (ref 0–0.7)
EOSINOPHIL NFR BLD AUTO: 2 %
ERYTHROCYTE [DISTWIDTH] IN BLOOD BY AUTOMATED COUNT: 12.7 % (ref 11–15)
GLOBULIN PLAS-MCNC: 3.6 G/DL (ref 2.8–4.4)
GLUCOSE BLD-MCNC: 92 MG/DL (ref 70–99)
HCT VFR BLD AUTO: 39 %
HDLC SERPL-MCNC: 81 MG/DL (ref 40–59)
HGB BLD-MCNC: 13.2 G/DL
IMM GRANULOCYTES # BLD AUTO: 0.01 X10(3) UL (ref 0–1)
IMM GRANULOCYTES NFR BLD: 0.2 %
LDLC SERPL CALC-MCNC: 39 MG/DL (ref ?–100)
LYMPHOCYTES # BLD AUTO: 2.01 X10(3) UL (ref 1–4)
LYMPHOCYTES NFR BLD AUTO: 36.8 %
M PROTEIN MFR SERPL ELPH: 7.4 G/DL (ref 6.4–8.2)
MCH RBC QN AUTO: 32.2 PG (ref 26–34)
MCHC RBC AUTO-ENTMCNC: 33.8 G/DL (ref 31–37)
MCV RBC AUTO: 95.1 FL
MONOCYTES # BLD AUTO: 0.43 X10(3) UL (ref 0.1–1)
MONOCYTES NFR BLD AUTO: 7.9 %
NEUTROPHILS # BLD AUTO: 2.84 X10 (3) UL (ref 1.5–7.7)
NEUTROPHILS # BLD AUTO: 2.84 X10(3) UL (ref 1.5–7.7)
NEUTROPHILS NFR BLD AUTO: 52 %
NONHDLC SERPL-MCNC: 60 MG/DL (ref ?–130)
OSMOLALITY SERPL CALC.SUM OF ELEC: 291 MOSM/KG (ref 275–295)
PATIENT FASTING Y/N/NP: YES
PATIENT FASTING Y/N/NP: YES
PLATELET # BLD AUTO: 263 10(3)UL (ref 150–450)
POTASSIUM SERPL-SCNC: 3.8 MMOL/L (ref 3.5–5.1)
RBC # BLD AUTO: 4.1 X10(6)UL
SODIUM SERPL-SCNC: 141 MMOL/L (ref 136–145)
TRIGL SERPL-MCNC: 105 MG/DL (ref 30–149)
TSI SER-ACNC: 2.51 MIU/ML (ref 0.36–3.74)
VLDLC SERPL CALC-MCNC: 21 MG/DL (ref 0–30)
WBC # BLD AUTO: 5.5 X10(3) UL (ref 4–11)

## 2021-03-15 PROCEDURE — 84443 ASSAY THYROID STIM HORMONE: CPT

## 2021-03-15 PROCEDURE — 87389 HIV-1 AG W/HIV-1&-2 AB AG IA: CPT

## 2021-03-15 PROCEDURE — 80061 LIPID PANEL: CPT

## 2021-03-15 PROCEDURE — 80053 COMPREHEN METABOLIC PANEL: CPT

## 2021-03-15 PROCEDURE — 36415 COLL VENOUS BLD VENIPUNCTURE: CPT

## 2021-03-15 PROCEDURE — 85025 COMPLETE CBC W/AUTO DIFF WBC: CPT

## 2021-03-16 ENCOUNTER — OFFICE VISIT (OUTPATIENT)
Dept: PHYSICAL THERAPY | Age: 56
End: 2021-03-16
Attending: FAMILY MEDICINE
Payer: MEDICARE

## 2021-03-16 DIAGNOSIS — S39.012D LUMBAR STRAIN, SUBSEQUENT ENCOUNTER: ICD-10-CM

## 2021-03-16 DIAGNOSIS — M54.50 CHRONIC BILATERAL LOW BACK PAIN WITHOUT SCIATICA: ICD-10-CM

## 2021-03-16 DIAGNOSIS — M47.817 SPONDYLOSIS OF LUMBOSACRAL SPINE WITHOUT MYELOPATHY: ICD-10-CM

## 2021-03-16 DIAGNOSIS — G89.29 CHRONIC BILATERAL LOW BACK PAIN WITHOUT SCIATICA: ICD-10-CM

## 2021-03-16 PROCEDURE — 97162 PT EVAL MOD COMPLEX 30 MIN: CPT | Performed by: PHYSICAL THERAPIST

## 2021-03-16 PROCEDURE — 97110 THERAPEUTIC EXERCISES: CPT | Performed by: PHYSICAL THERAPIST

## 2021-03-16 NOTE — PROGRESS NOTES
P.T. EVALUATION:   Referring Physician: Dr. Akua Lyn  Diagnosis: Chronic bilateral low back pain without sciatica (M54.5,G89.29)  Spondylosis of lumbosacral spine without myelopathy (M47.817)  Lumbar strain, subsequent encounter (Y87.039Q)    Date of Onset: activities. Precautions:  None     OBJECTIVE:   Observation: Alert and oriented x 3. Ambulatory into department. Palpation: (+) tenderness over B upper trapezius, (-) tenderness on lower back     ROM: Trunk ROM is moderately limited into all planes. not more than 1/10 during activity. 3. Increase cervical and lumbar spine ROM to [facility] into all planes to improve mobility. 4. Increase strength to 5/5 throughout to be able to perform household activities without difficulty.   5. Patient will verbalize and

## 2021-03-18 ENCOUNTER — OFFICE VISIT (OUTPATIENT)
Dept: PHYSICAL THERAPY | Age: 56
End: 2021-03-18
Attending: FAMILY MEDICINE
Payer: MEDICARE

## 2021-03-18 PROCEDURE — 97110 THERAPEUTIC EXERCISES: CPT | Performed by: PHYSICAL THERAPIST

## 2021-03-18 PROCEDURE — 97140 MANUAL THERAPY 1/> REGIONS: CPT | Performed by: PHYSICAL THERAPIST

## 2021-03-18 NOTE — PROGRESS NOTES
Diagnosis: Chronic bilateral low back pain without sciatica (M54.5,G89.29)  Spondylosis of lumbosacral spine without myelopathy (M47.817)  Lumbar strain, subsequent encounter (Z75.162N        Next MD visit: none scheduled  Fall Risk: standard         Pre

## 2021-03-25 ENCOUNTER — APPOINTMENT (OUTPATIENT)
Dept: PHYSICAL THERAPY | Age: 56
End: 2021-03-25
Attending: FAMILY MEDICINE
Payer: MEDICARE

## 2021-03-25 NOTE — H&P
[address] [facility] [address] Gastroenterology                                                                                                      Clinic H&P  Patient presents w Past Medical History:   Diagnosis Date   • Anxiety state    • Asthma    • Cervical spine arthritis    • Cholecystitis    • Congestive heart disease (HCC)    • Dementia ([facility] [address]    • Depression    • Esophageal reflux       Past Surgical History:   Procedur hours as needed for Wheezing.            Allergies:  No Known Allergies    ROS:   CONSTITUTIONAL:  negative for fevers, chills  EYES:  negative for change in vision  RESPIRATORY:  negative for  shortness of breath  CARDIOVASCULAR:  negative for  chest pain 2015 and was negative with the exception of hemorrhoids, diverticulosis, and hiatal hernia. She has no red flag or alarm symptoms at present and no significant GI family history.   Given that there are no acute symptoms or family history and the findings o

## 2021-03-31 ENCOUNTER — OFFICE VISIT (OUTPATIENT)
Dept: PHYSICAL THERAPY | Age: 56
End: 2021-03-31
Attending: FAMILY MEDICINE
Payer: MEDICARE

## 2021-03-31 PROCEDURE — 97110 THERAPEUTIC EXERCISES: CPT | Performed by: PHYSICAL THERAPIST

## 2021-03-31 PROCEDURE — 97014 ELECTRIC STIMULATION THERAPY: CPT | Performed by: PHYSICAL THERAPIST

## 2021-03-31 NOTE — PROGRESS NOTES
Diagnosis: Chronic bilateral low back pain without sciatica (M54.5,G89.29)  Spondylosis of lumbosacral spine without myelopathy (M47.817)  Lumbar strain, subsequent encounter (P08.324N        Next MD visit: none scheduled  Fall Risk: standard         Pre strength to 5/5 throughout to be able to perform household activities without difficulty. 5. Patient will verbalize and demonstrate strategies to improve posture during activity. Plan: Continue PT to improve mobility.       Charges: Keenan Stall

## 2021-04-05 ENCOUNTER — OFFICE VISIT (OUTPATIENT)
Dept: SURGERY | Facility: CLINIC | Age: 56
End: 2021-04-05
Payer: MEDICARE

## 2021-04-05 DIAGNOSIS — M67.442 GANGLION OF LEFT HAND: Primary | ICD-10-CM

## 2021-04-05 PROCEDURE — 99203 OFFICE O/P NEW LOW 30 MIN: CPT | Performed by: PLASTIC SURGERY

## 2021-04-05 NOTE — H&P
Dennise Huang is a 60year old female that presents with Patient presents with:  Pain: LMF      REFERRED BY:  Yogi Dover    Pacemaker: No  Latex Allergy: no  Coumadin: No  Plavix: No  Other anticoagulants: No  Cardiac stents: No    HAND DOMINANCE:  Rig Transdermal Patch 24 Hr Place 1 patch onto the skin daily. 28 patch 2   • Albuterol Sulfate  (90 BASE) MCG/ACT Inhalation Aero Soln Inhale 2 puffs into the lungs every 6 (six) hours as needed for Wheezing.             SOCIAL HISTORY  Social History discussed what a ganglion/mass is, including treatment options. Questions were answered and the patient wishes to proceed with treatment. This ganglion/mass is  Asymptomatic / minimally symptomatic. It does not need to be excised at this point.   If i

## 2021-04-07 ENCOUNTER — OFFICE VISIT (OUTPATIENT)
Dept: PHYSICAL THERAPY | Age: 56
End: 2021-04-07
Attending: FAMILY MEDICINE
Payer: MEDICARE

## 2021-04-07 PROCEDURE — 97110 THERAPEUTIC EXERCISES: CPT | Performed by: PHYSICAL THERAPIST

## 2021-04-07 NOTE — PROGRESS NOTES
Diagnosis: Chronic bilateral low back pain without sciatica (M54.5,G89.29)  Spondylosis of lumbosacral spine without myelopathy (M47.817)  Lumbar strain, subsequent encounter (S86.752P        Next MD visit: none scheduled  Fall Risk: standard         Pre Assessment: Patient with improved tolerance to therapeutic exercises as back pain has decreased. Patient and PT goals are in progress. Goals     • Therapy Goals      1.  Patient will be independent with HEP, its progression, and management of res

## 2021-04-08 ENCOUNTER — OFFICE VISIT (OUTPATIENT)
Dept: GASTROENTEROLOGY | Facility: CLINIC | Age: 56
End: 2021-04-08
Payer: MEDICARE

## 2021-04-08 VITALS
WEIGHT: 120 LBS | BODY MASS INDEX: 22.66 KG/M2 | SYSTOLIC BLOOD PRESSURE: 126 MMHG | HEART RATE: 82 BPM | DIASTOLIC BLOOD PRESSURE: 74 MMHG | HEIGHT: 61 IN

## 2021-04-08 DIAGNOSIS — K44.9 HIATAL HERNIA WITH GERD: ICD-10-CM

## 2021-04-08 DIAGNOSIS — K21.9 HIATAL HERNIA WITH GERD: ICD-10-CM

## 2021-04-08 DIAGNOSIS — Z12.11 SCREENING FOR COLON CANCER: Primary | ICD-10-CM

## 2021-04-08 PROCEDURE — 99203 OFFICE O/P NEW LOW 30 MIN: CPT | Performed by: NURSE PRACTITIONER

## 2021-04-14 ENCOUNTER — APPOINTMENT (OUTPATIENT)
Dept: PHYSICAL THERAPY | Age: 56
End: 2021-04-14
Attending: FAMILY MEDICINE
Payer: MEDICARE

## 2021-04-15 ENCOUNTER — OFFICE VISIT (OUTPATIENT)
Dept: PHYSICAL THERAPY | Age: 56
End: 2021-04-15
Attending: FAMILY MEDICINE
Payer: MEDICARE

## 2021-04-15 PROCEDURE — 97110 THERAPEUTIC EXERCISES: CPT | Performed by: PHYSICAL THERAPIST

## 2021-04-19 ENCOUNTER — OFFICE VISIT (OUTPATIENT)
Dept: PHYSICAL THERAPY | Age: 56
End: 2021-04-19
Attending: FAMILY MEDICINE
Payer: MEDICARE

## 2021-04-19 PROCEDURE — 97110 THERAPEUTIC EXERCISES: CPT | Performed by: PHYSICAL THERAPIST

## 2021-04-19 NOTE — PROGRESS NOTES
Diagnosis: Chronic bilateral low back pain without sciatica (M54.5,G89.29)  Spondylosis of lumbosacral spine without myelopathy (M47.817)  Lumbar strain, subsequent encounter (D73.478C        Next MD visit: none scheduled  Fall Risk: standard         Pre theraband resisted hip external rotation 10 x 2  - sidelying LE abduction with yellow theraband around knees 10 x 2  - prone trunk [id number]  - prone LE extensions 20x  - lat pull down 20# 10 x 2  - face pulls 5# 10 x 2  - Shuttle B LE extensions 4 ban

## 2021-04-26 ENCOUNTER — APPOINTMENT (OUTPATIENT)
Dept: PHYSICAL THERAPY | Age: 56
End: 2021-04-26
Attending: FAMILY MEDICINE
Payer: MEDICARE

## 2021-04-26 ENCOUNTER — OFFICE VISIT (OUTPATIENT)
Dept: OBGYN CLINIC | Facility: CLINIC | Age: 56
End: 2021-04-26
Payer: MEDICARE

## 2021-04-26 ENCOUNTER — TELEPHONE (OUTPATIENT)
Dept: PHYSICAL THERAPY | Age: 56
End: 2021-04-26

## 2021-04-26 VITALS
HEART RATE: 80 BPM | SYSTOLIC BLOOD PRESSURE: 121 MMHG | WEIGHT: 125 LBS | BODY MASS INDEX: 24 KG/M2 | DIASTOLIC BLOOD PRESSURE: 74 MMHG

## 2021-04-26 DIAGNOSIS — Z01.411 ENCOUNTER FOR GYNECOLOGICAL EXAMINATION WITH ABNORMAL FINDING: ICD-10-CM

## 2021-04-26 DIAGNOSIS — Z12.4 SCREENING FOR MALIGNANT NEOPLASM OF CERVIX: ICD-10-CM

## 2021-04-26 DIAGNOSIS — Z12.31 VISIT FOR SCREENING MAMMOGRAM: ICD-10-CM

## 2021-04-26 DIAGNOSIS — N95.1 HOT FLASHES, MENOPAUSAL: Primary | ICD-10-CM

## 2021-04-26 PROCEDURE — G0101 CA SCREEN;PELVIC/BREAST EXAM: HCPCS | Performed by: OBSTETRICS & GYNECOLOGY

## 2021-04-26 PROCEDURE — 99203 OFFICE O/P NEW LOW 30 MIN: CPT | Performed by: OBSTETRICS & GYNECOLOGY

## 2021-04-26 RX ORDER — VENLAFAXINE HYDROCHLORIDE 37.5 MG/1
37.5 CAPSULE, EXTENDED RELEASE ORAL DAILY
Qty: 7 CAPSULE | Refills: 0 | Status: SHIPPED | OUTPATIENT
Start: 2021-04-26 | End: 2021-05-03

## 2021-04-26 RX ORDER — VENLAFAXINE HYDROCHLORIDE 75 MG/1
75 CAPSULE, EXTENDED RELEASE ORAL DAILY
Qty: 30 CAPSULE | Refills: 11 | Status: SHIPPED | OUTPATIENT
Start: 2021-04-26 | End: 2021-10-12

## 2021-04-26 NOTE — PROGRESS NOTES
John Montes is a 60year old female [id number] No LMP recorded. Patient is postmenopausal. Patient presents with:  Gyn Exam: Annual.. Dio Kelseyo Mammo order. SEBASTIAN pt -- last seen over 3 yrs ago  Menopause: having horrible hot flashes -- has been for years.  issue causi Mother    • Macular degeneration Mother    • Other (Osteoarthritis) Mother         Osteoarthritis   • Cancer Sister         Cancer, pancreatic   • Other (Scoliosis) Sister         Scoliosis   • Stroke Father         per NextGen:  \"CVA (Stroke), (cause of adenopathy  Lymphatic: no abnormal supraclavicular or axillary adenopathy is noted  Breast:   normal without palpable masses, tenderness, asymmetry, nipple discharge, nipple retraction or skin changes  Abdomen:   soft, nontender, nondistended, no masses  S option would be HRT but has increased risk of MI in first year of use. Wishes to try effexor. Reviewed tech of starting. Warned pt needs to be weaned off if does not want to use anymore. Call if any VB.   Spent total time 45 minutes on obtaining history /

## 2021-04-28 ENCOUNTER — OFFICE VISIT (OUTPATIENT)
Dept: PHYSICAL THERAPY | Age: 56
End: 2021-04-28
Attending: FAMILY MEDICINE
Payer: MEDICARE

## 2021-04-28 PROCEDURE — 97110 THERAPEUTIC EXERCISES: CPT | Performed by: PHYSICAL THERAPIST

## 2021-04-28 NOTE — PROGRESS NOTES
Diagnosis: Chronic bilateral low back pain without sciatica (M54.5,G89.29)  Spondylosis of lumbosacral spine without myelopathy (M47.817)  Lumbar strain, subsequent encounter (I47.622Z        Next MD visit: none scheduled  Fall Risk: standard         Pre with therapeutic exercises and reports feeling better. Patient is independent with her home exercises. Patient and PT goals are in progress. Goals     • Therapy Goals      1.  Patient will be independent with HEP, its progression, and management of resi

## 2021-04-29 ENCOUNTER — APPOINTMENT (OUTPATIENT)
Dept: PHYSICAL THERAPY | Age: 56
End: 2021-04-29
Attending: FAMILY MEDICINE
Payer: MEDICARE

## 2021-05-03 ENCOUNTER — OFFICE VISIT (OUTPATIENT)
Dept: PHYSICAL THERAPY | Age: 56
End: 2021-05-03
Attending: FAMILY MEDICINE
Payer: MEDICARE

## 2021-05-03 PROCEDURE — 97110 THERAPEUTIC EXERCISES: CPT | Performed by: PHYSICAL THERAPIST

## 2021-05-03 NOTE — PROGRESS NOTES
Diagnosis: Chronic bilateral low back pain without sciatica (M54.5,G89.29)  Spondylosis of lumbosacral spine without myelopathy (M47.817)  Lumbar strain, subsequent encounter (L37.038J        Next MD visit: none scheduled  Fall Risk: standard         Pre theraband around knees 10 x 2  - repeat standing trunk extensions 10x     - standing trunk extensions 10x  - seated neck retraction, rotation, lateral flexion 5x  - supine single knee to chest 10x  - supine hamstring stretch 10x  - supine cervical spine AZ

## 2021-05-06 ENCOUNTER — OFFICE VISIT (OUTPATIENT)
Dept: PHYSICAL THERAPY | Age: 56
End: 2021-05-06
Attending: FAMILY MEDICINE
Payer: MEDICARE

## 2021-05-06 ENCOUNTER — OFFICE VISIT (OUTPATIENT)
Dept: FAMILY MEDICINE CLINIC | Facility: CLINIC | Age: 56
End: 2021-05-06
Payer: MEDICARE

## 2021-05-06 VITALS
HEIGHT: 61 IN | SYSTOLIC BLOOD PRESSURE: 123 MMHG | DIASTOLIC BLOOD PRESSURE: 73 MMHG | WEIGHT: 120.81 LBS | TEMPERATURE: 98 F | BODY MASS INDEX: 22.81 KG/M2 | HEART RATE: 69 BPM

## 2021-05-06 DIAGNOSIS — M79.674 PAIN AND SWELLING OF TOE OF RIGHT FOOT: ICD-10-CM

## 2021-05-06 DIAGNOSIS — M21.42 PES PLANUS OF BOTH FEET: Primary | ICD-10-CM

## 2021-05-06 DIAGNOSIS — M21.41 PES PLANUS OF BOTH FEET: Primary | ICD-10-CM

## 2021-05-06 DIAGNOSIS — M20.11 HALLUX VALGUS WITH BUNIONS OF RIGHT FOOT: ICD-10-CM

## 2021-05-06 DIAGNOSIS — M21.611 HALLUX VALGUS WITH BUNIONS OF RIGHT FOOT: ICD-10-CM

## 2021-05-06 DIAGNOSIS — M79.89 PAIN AND SWELLING OF TOE OF RIGHT FOOT: ICD-10-CM

## 2021-05-06 PROCEDURE — 99213 OFFICE O/P EST LOW 20 MIN: CPT | Performed by: FAMILY MEDICINE

## 2021-05-06 PROCEDURE — 97110 THERAPEUTIC EXERCISES: CPT | Performed by: PHYSICAL THERAPIST

## 2021-05-06 NOTE — PROGRESS NOTES
Patient ID: Andrez Monsalve is a 60year old female. HPI  Patient presents with: Foot Pain    Last seen by me on 3/11/2021. Pt works at [employer] in [employer]. She stands often at work. Pt c/o pain and bunion on the right foot.  She patch 2   • Albuterol Sulfate  (90 BASE) MCG/ACT Inhalation Aero Soln Inhale 2 puffs into the lungs every 6 (six) hours as needed for Wheezing.          Allergies:No Known Allergies     Physical Exam:       Physical Exam  Blood pressure 123/73, pulse Instructions on file for this visit. Amanda Settler    5/6/2021    By signing my name below, Lilly Rylee,  attest that this documentation has been prepared under the direction and in the presence of Victorino Austin DO.    Electronically Signed

## 2021-05-10 DIAGNOSIS — M62.838 NECK MUSCLE SPASM: ICD-10-CM

## 2021-05-10 DIAGNOSIS — M62.838 TRAPEZIUS MUSCLE SPASM: ICD-10-CM

## 2021-05-10 DIAGNOSIS — G89.29 CHRONIC BILATERAL LOW BACK PAIN WITHOUT SCIATICA: ICD-10-CM

## 2021-05-10 DIAGNOSIS — M54.50 CHRONIC BILATERAL LOW BACK PAIN WITHOUT SCIATICA: ICD-10-CM

## 2021-05-10 DIAGNOSIS — M47.817 SPONDYLOSIS OF LUMBOSACRAL SPINE WITHOUT MYELOPATHY: ICD-10-CM

## 2021-05-10 NOTE — TELEPHONE ENCOUNTER
Per pharmacy, pt requesting a refill on the following medication:    NABUMETONE 750MG TABLETS  Sig: Take 1 tablet by mouth twice daily  QTY: 180

## 2021-05-12 RX ORDER — NABUMETONE 750 MG/1
TABLET, FILM COATED ORAL
Qty: 180 TABLET | Refills: 0 | Status: SHIPPED | OUTPATIENT
Start: 2021-05-12 | End: 2021-09-19

## 2021-05-18 ENCOUNTER — APPOINTMENT (OUTPATIENT)
Dept: PHYSICAL THERAPY | Age: 56
End: 2021-05-18
Attending: FAMILY MEDICINE
Payer: MEDICARE

## 2021-05-22 ENCOUNTER — HOSPITAL ENCOUNTER (OUTPATIENT)
Dept: MAMMOGRAPHY | Age: 56
Discharge: HOME | End: 2021-05-22
Attending: OBSTETRICS & GYNECOLOGY
Payer: MEDICARE

## 2021-05-22 DIAGNOSIS — Z12.31 VISIT FOR SCREENING MAMMOGRAM: ICD-10-CM

## 2021-05-22 PROCEDURE — 77063 BREAST TOMOSYNTHESIS BI: CPT | Performed by: OBSTETRICS & GYNECOLOGY

## 2021-05-22 PROCEDURE — 77067 SCR MAMMO BI INCL CAD: CPT | Performed by: OBSTETRICS & GYNECOLOGY

## 2021-05-28 ENCOUNTER — OFFICE VISIT (OUTPATIENT)
Dept: PODIATRY CLINIC | Facility: CLINIC | Age: 56
End: 2021-05-28
Payer: MEDICARE

## 2021-05-28 ENCOUNTER — HOSPITAL ENCOUNTER (OUTPATIENT)
Dept: GENERAL RADIOLOGY | Age: 56
Discharge: HOME | End: 2021-05-28
Attending: PODIATRIST
Payer: MEDICARE

## 2021-05-28 DIAGNOSIS — M21.611 BUNION, RIGHT FOOT: ICD-10-CM

## 2021-05-28 DIAGNOSIS — M79.671 RIGHT FOOT PAIN: Primary | ICD-10-CM

## 2021-05-28 DIAGNOSIS — M79.671 RIGHT FOOT PAIN: ICD-10-CM

## 2021-05-28 PROCEDURE — 99203 OFFICE O/P NEW LOW 30 MIN: CPT | Performed by: PODIATRIST

## 2021-05-28 PROCEDURE — 73630 X-RAY EXAM OF FOOT: CPT | Performed by: PODIATRIST

## 2021-05-28 NOTE — PROGRESS NOTES
[address] [facility] Podiatry  Progress Note    Gay Johns is a 60year old female.  Patient presents with:  Bunions: Right big toe - onset 2 years ago - became bothersome with different type of shoes , swelled up - rates pain as 4/10 on and off         HPI: infarction) Father         per NextGen   • No Known Problems Daughter       Social History    Socioeconomic History      Marital status: Single      Spouse name: Not on file      Number of children: Not on file      Years of education: Not on file      Hig right 1st met head medial aspect  Right 1st met elevatus      LABS & IMAGING:     Lab Results   Component Value Date    GLU 92 03/15/2021    BUN 10 03/15/2021    CREATSERUM 0.75 03/15/2021    BUNCREA 13.3 03/15/2021    ANIONGAP 9 03/15/2021    GFRAA 102 03

## 2021-06-11 ENCOUNTER — OFFICE VISIT (OUTPATIENT)
Dept: PODIATRY CLINIC | Facility: CLINIC | Age: 56
End: 2021-06-11
Payer: MEDICARE

## 2021-06-11 DIAGNOSIS — M79.671 RIGHT FOOT PAIN: Primary | ICD-10-CM

## 2021-06-11 DIAGNOSIS — M21.611 BUNION, RIGHT FOOT: ICD-10-CM

## 2021-06-11 DIAGNOSIS — L84 FOOT CALLUS: ICD-10-CM

## 2021-06-11 DIAGNOSIS — M20.41 HAMMERTOE OF RIGHT FOOT: ICD-10-CM

## 2021-06-11 PROCEDURE — 11055 PARING/CUTG B9 HYPRKER LES 1: CPT | Performed by: PODIATRIST

## 2021-06-11 PROCEDURE — 99213 OFFICE O/P EST LOW 20 MIN: CPT | Performed by: PODIATRIST

## 2021-06-11 NOTE — PROGRESS NOTES
[facility] Podiatry  Progress Note    Monica Walker is a 60year old female.  Patient presents with:  Bunions: Right foot - still has pain rated as 1-2/10 at all the time        HPI:     This is a pleasant female that smokes 2 cigs/day that presents t status: Single      Spouse name: Not on file      Number of children: Not on file      Years of education: Not on file      Highest education level: Not on file    Tobacco Use      Smoking status: Current Some Day Smoker        Years: 10.00        Types: C BUNCREA 13.3 03/15/2021    ANIONGAP 9 03/15/2021    GFRAA 102 03/15/2021    GFRNAA 89 03/15/2021    CA 9.1 03/15/2021     03/15/2021    K 3.8 03/15/2021     03/15/2021    CO2 24.0 03/15/2021    OSMOCALC 291 03/15/2021        No results found fo

## 2021-06-16 ENCOUNTER — OFFICE VISIT (OUTPATIENT)
Dept: PHYSICAL THERAPY | Age: 56
End: 2021-06-16
Attending: FAMILY MEDICINE
Payer: MEDICARE

## 2021-06-16 PROCEDURE — 97110 THERAPEUTIC EXERCISES: CPT | Performed by: PHYSICAL THERAPIST

## 2021-06-16 NOTE — PROGRESS NOTES
Diagnosis: Chronic bilateral low back pain without sciatica (M54.5,G89.29)  Spondylosis of lumbosacral spine without myelopathy (M47.817)  Lumbar strain, subsequent encounter (P99.772H        Next MD visit: none scheduled  Fall Risk: standard         Pre x 5 sec hold  - supine SLR with red theraband around knees 10 x 2  - hooklying red theraband resisted hip external rotation 10 x 2  - sidelying LE abduction with red theraband around knees 10 x 2  - Shuttle B LE extensions 5 bands 10 x 3   - Shuttle single household activities without difficulty. 5. Patient will verbalize and demonstrate strategies to improve posture during activity.           Charges: EX3  Total Timed Treatment: 40 min  Total Treatment Time: 40 min      Plan: Continue skilled Physical Thera

## 2021-06-23 ENCOUNTER — OFFICE VISIT (OUTPATIENT)
Dept: PHYSICAL THERAPY | Age: 56
End: 2021-06-23
Attending: FAMILY MEDICINE
Payer: MEDICARE

## 2021-06-23 PROCEDURE — 97110 THERAPEUTIC EXERCISES: CPT | Performed by: PHYSICAL THERAPIST

## 2021-06-23 NOTE — PROGRESS NOTES
Diagnosis: Chronic bilateral low back pain without sciatica (M54.5,G89.29)  Spondylosis of lumbosacral spine without myelopathy (M47.817)  Lumbar strain, subsequent encounter (N23.928B        Next MD visit: none scheduled  Fall Risk: standard         Pre difficulty. 5. Patient will verbalize and demonstrate strategies to improve posture during activity.           Charges: EX3  Total Timed Treatment: 40 min  Total Treatment Time: 40 min      Plan: Continue PT x 1 more visit then discharge to a home program.

## 2021-06-30 ENCOUNTER — OFFICE VISIT (OUTPATIENT)
Dept: PHYSICAL THERAPY | Age: 56
End: 2021-06-30
Attending: FAMILY MEDICINE
Payer: MEDICARE

## 2021-06-30 PROCEDURE — 97110 THERAPEUTIC EXERCISES: CPT | Performed by: PHYSICAL THERAPIST

## 2021-06-30 NOTE — PROGRESS NOTES
Progress Summary  Pt has attended 12 visits in Physical Therapy.      Diagnosis: Chronic bilateral low back pain without sciatica (M54.5,G89.29)  Spondylosis of lumbosacral spine without myelopathy (M47.817)  Lumbar strain, subsequent encounter (D70.516P rotation 10 x 2  - supine bridging with red theraband around knees 10 x 5 sec hold  - Shuttle B LE extensions 6 bands 10 x 3   - Shuttle single LE extensions 4 bands 10 x 2     Manual PT       Modalities           Assessment: Referred to PT due to initial

## 2021-09-18 DIAGNOSIS — M54.50 CHRONIC BILATERAL LOW BACK PAIN WITHOUT SCIATICA: ICD-10-CM

## 2021-09-18 DIAGNOSIS — M62.838 TRAPEZIUS MUSCLE SPASM: ICD-10-CM

## 2021-09-18 DIAGNOSIS — G89.29 CHRONIC BILATERAL LOW BACK PAIN WITHOUT SCIATICA: ICD-10-CM

## 2021-09-18 DIAGNOSIS — M47.817 SPONDYLOSIS OF LUMBOSACRAL SPINE WITHOUT MYELOPATHY: ICD-10-CM

## 2021-09-18 DIAGNOSIS — M62.838 NECK MUSCLE SPASM: ICD-10-CM

## 2021-09-19 RX ORDER — NABUMETONE 750 MG/1
TABLET, FILM COATED ORAL
Qty: 180 TABLET | Refills: 0 | Status: SHIPPED | OUTPATIENT
Start: 2021-09-19

## 2021-10-11 ENCOUNTER — OFFICE VISIT (OUTPATIENT)
Dept: PODIATRY CLINIC | Facility: CLINIC | Age: 56
End: 2021-10-11
Payer: MEDICARE

## 2021-10-11 VITALS
DIASTOLIC BLOOD PRESSURE: 80 MMHG | HEART RATE: 61 BPM | BODY MASS INDEX: 24.17 KG/M2 | SYSTOLIC BLOOD PRESSURE: 110 MMHG | HEIGHT: 61 IN | WEIGHT: 128 LBS

## 2021-10-11 DIAGNOSIS — M20.41 HAMMERTOE OF RIGHT FOOT: ICD-10-CM

## 2021-10-11 DIAGNOSIS — M79.671 RIGHT FOOT PAIN: Primary | ICD-10-CM

## 2021-10-11 DIAGNOSIS — M21.611 BUNION, RIGHT FOOT: ICD-10-CM

## 2021-10-11 DIAGNOSIS — L84 FOOT CALLUS: ICD-10-CM

## 2021-10-11 PROCEDURE — 99212 OFFICE O/P EST SF 10 MIN: CPT | Performed by: PODIATRIST

## 2021-10-11 PROCEDURE — 20600 DRAIN/INJ JOINT/BURSA W/O US: CPT | Performed by: PODIATRIST

## 2021-10-11 RX ORDER — TRIAMCINOLONE ACETONIDE 40 MG/ML
40 INJECTION, SUSPENSION INTRA-ARTICULAR; INTRAMUSCULAR ONCE
Status: COMPLETED | OUTPATIENT
Start: 2021-10-11 | End: 2021-10-11

## 2021-10-11 RX ADMIN — TRIAMCINOLONE ACETONIDE 40 MG: 40 INJECTION, SUSPENSION INTRA-ARTICULAR; INTRAMUSCULAR at 12:09:00

## 2021-10-11 NOTE — PROGRESS NOTES
[facility], [facility] Podiatry  Progress Note    Nathalie Lyons is a 60year old female. Patient presents with:   Follow - Up: sherrellent here to discuss bunionectomy of the right foot ,she states she had an episode of great toe severe pain and swelling ,lasted 10 da [employer]   • No Known Problems Daughter       Social History    Socioeconomic History      Marital status: Single    Tobacco Use      Smoking status: Current Some Day Smoker        Years: 10.00        Types: Cigarettes      Smokeless tobacco: Never Used 03/15/2021    GFRAA 102 03/15/2021    GFRNAA 89 03/15/2021    CA 9.1 03/15/2021     03/15/2021    K 3.8 03/15/2021     03/15/2021    CO2 24.0 03/15/2021    OSMOCALC 291 03/15/2021        No results found for: EAG, A1C     No results found. bunionectomy right 2nd and 3rd toe arthroplasty at next appt     RTC 2 weeks    No follow-ups on file.     Faina Gray DPM  10/11/21

## 2021-10-11 NOTE — PROGRESS NOTES
Per Dr Malathi Ibanez draw 1 ml 40 mg  Triamcinolone and 1 ml of ropivacaine for right   Foot injection,BR   Patient left the office without post vitals

## 2021-10-12 ENCOUNTER — OFFICE VISIT (OUTPATIENT)
Dept: FAMILY MEDICINE CLINIC | Facility: CLINIC | Age: 56
End: 2021-10-12
Payer: MEDICARE

## 2021-10-12 VITALS
BODY MASS INDEX: 22.58 KG/M2 | DIASTOLIC BLOOD PRESSURE: 76 MMHG | HEART RATE: 75 BPM | WEIGHT: 119.63 LBS | HEIGHT: 61 IN | SYSTOLIC BLOOD PRESSURE: 121 MMHG | TEMPERATURE: 98 F

## 2021-10-12 DIAGNOSIS — M79.89 PAIN AND SWELLING OF TOE OF LEFT FOOT: Primary | ICD-10-CM

## 2021-10-12 DIAGNOSIS — Z12.11 SCREENING FOR COLON CANCER: ICD-10-CM

## 2021-10-12 DIAGNOSIS — S39.011A ABDOMINAL MUSCLE STRAIN, INITIAL ENCOUNTER: ICD-10-CM

## 2021-10-12 DIAGNOSIS — M54.50 CHRONIC LEFT-SIDED LOW BACK PAIN WITHOUT SCIATICA: ICD-10-CM

## 2021-10-12 DIAGNOSIS — M79.675 PAIN AND SWELLING OF TOE OF LEFT FOOT: Primary | ICD-10-CM

## 2021-10-12 DIAGNOSIS — G89.29 CHRONIC LEFT-SIDED LOW BACK PAIN WITHOUT SCIATICA: ICD-10-CM

## 2021-10-12 DIAGNOSIS — R10.32 LLQ ABDOMINAL PAIN: ICD-10-CM

## 2021-10-12 PROCEDURE — 99214 OFFICE O/P EST MOD 30 MIN: CPT | Performed by: FAMILY MEDICINE

## 2021-10-12 RX ORDER — BACLOFEN 10 MG/1
10 TABLET ORAL 3 TIMES DAILY PRN
Qty: 90 TABLET | Refills: 0 | Status: SHIPPED | OUTPATIENT
Start: 2021-10-12 | End: 2021-11-11

## 2021-10-12 NOTE — PROGRESS NOTES
Patient ID: Yoni Martínez is a 60year old female. HPI  Patient presents with: Foot Pain: Folow up with Dr. Joseph Bryant gout   Low Back Pain: Left side pain lower back/hip    Last seen by me on 5/6/2021.      Pt works at [employer] in [place] an kg/m²  04/08/21 : 22.67 kg/m²  03/11/21 : 23.09 kg/m²      BP Readings from Last 6 Encounters:  10/12/21 : 121/76  10/11/21 : 110/80  05/06/21 : 123/73  04/26/21 : 121/74  04/08/21 : 126/74  03/11/21 : 128/82        Review of Systems   Gastrointestinal: Po raise left. No tenderness of the right or left lumbar area. No tenderness of the spine itself. Abdominal: Normal appearance and bowel sounds are normal.   There is no rigidity, no rebound, no guarding. Tenderness in LLQ.  Tender over oblique muscle on le symptoms persist.  Take medicine (if given) as prescribed. Approach to treatment discussed and patient/family member understands and agrees to plan. No follow-ups on file. There are no Patient Instructions on file for this visit.     Eugenia Wolf

## 2021-11-08 ENCOUNTER — HOSPITAL ENCOUNTER (EMERGENCY)
Facility: HOSPITAL | Age: 56
Discharge: HOME | End: 2021-11-08
Attending: EMERGENCY MEDICINE
Payer: MEDICARE

## 2021-11-08 VITALS
RESPIRATION RATE: 20 BRPM | TEMPERATURE: 97 F | HEIGHT: 61 IN | WEIGHT: 124 LBS | HEART RATE: 65 BPM | DIASTOLIC BLOOD PRESSURE: 85 MMHG | SYSTOLIC BLOOD PRESSURE: 150 MMHG | BODY MASS INDEX: 23.41 KG/M2 | OXYGEN SATURATION: 96 %

## 2021-11-08 DIAGNOSIS — M62.838 TRAPEZIUS MUSCLE SPASM: ICD-10-CM

## 2021-11-08 DIAGNOSIS — S16.1XXA STRAIN OF NECK MUSCLE, INITIAL ENCOUNTER: Primary | ICD-10-CM

## 2021-11-08 DIAGNOSIS — M54.12 CERVICAL RADICULOPATHY: ICD-10-CM

## 2021-11-08 PROCEDURE — 96372 THER/PROPH/DIAG INJ SC/IM: CPT

## 2021-11-08 PROCEDURE — 99283 EMERGENCY DEPT VISIT LOW MDM: CPT

## 2021-11-08 RX ORDER — LIDOCAINE 50 MG/G
1 PATCH TOPICAL EVERY 24 HOURS
Qty: 6 PATCH | Refills: 0 | Status: SHIPPED | OUTPATIENT
Start: 2021-11-08 | End: 2021-11-14

## 2021-11-08 RX ORDER — KETOROLAC TROMETHAMINE 30 MG/ML
30 INJECTION, SOLUTION INTRAMUSCULAR; INTRAVENOUS ONCE
Status: COMPLETED | OUTPATIENT
Start: 2021-11-08 | End: 2021-11-08

## 2021-11-08 RX ORDER — CYCLOBENZAPRINE HCL 10 MG
5 TABLET ORAL 3 TIMES DAILY PRN
Qty: 5 TABLET | Refills: 0 | Status: SHIPPED | OUTPATIENT
Start: 2021-11-08 | End: 2021-11-11

## 2021-11-08 RX ORDER — HYDROCODONE BITARTRATE AND ACETAMINOPHEN 5; 325 MG/1; MG/1
1 TABLET ORAL EVERY 6 HOURS PRN
Qty: 10 TABLET | Refills: 0 | Status: SHIPPED | OUTPATIENT
Start: 2021-11-08

## 2021-11-08 RX ORDER — METHYLPREDNISOLONE 4 MG/1
TABLET ORAL
Qty: 1 EACH | Refills: 0 | Status: SHIPPED | OUTPATIENT
Start: 2021-11-08

## 2021-11-08 NOTE — ED PROVIDER NOTES
Patient Seen in: [facility] AND [facility] Emergency Department      History   Patient presents with:  Neck Pain    Stated Complaint: neck pain     Subjective:   HPI    76-year-old with some chronic problems with her cervical spine arthritis and radicular sympto 150/85   Pulse 65   Temp 96.9 °F (36.1 °C) (Temporal)   Resp 20   Ht 154.9 cm (5' 1\")   Wt 56.2 kg   SpO2 96%   BMI 23.43 kg/m²         Physical Exam    Constitutional: Oriented to person, place, and time. Appears well-developed. No distress.    Head: Nor Plan     Clinical Impression:  Strain of neck muscle, initial encounter  (primary encounter diagnosis)  Trapezius muscle spasm  Cervical radiculopathy     Disposition:  Discharge  11/8/2021 12:13 pm    Follow-up:  Ramy Alves DO  [facility]

## 2021-11-12 ENCOUNTER — OFFICE VISIT (OUTPATIENT)
Dept: PODIATRY CLINIC | Facility: CLINIC | Age: 56
End: 2021-11-12
Payer: MEDICARE

## 2021-11-12 DIAGNOSIS — M21.611 BUNION, RIGHT FOOT: ICD-10-CM

## 2021-11-12 DIAGNOSIS — M20.41 HAMMERTOE OF RIGHT FOOT: ICD-10-CM

## 2021-11-12 DIAGNOSIS — L60.0 INGROWN TOENAIL OF LEFT FOOT: Primary | ICD-10-CM

## 2021-11-12 DIAGNOSIS — L84 FOOT CALLUS: ICD-10-CM

## 2021-11-12 DIAGNOSIS — M79.675 GREAT TOE PAIN, LEFT: ICD-10-CM

## 2021-11-12 PROCEDURE — 11720 DEBRIDE NAIL 1-5: CPT | Performed by: PODIATRIST

## 2021-11-12 PROCEDURE — 99213 OFFICE O/P EST LOW 20 MIN: CPT | Performed by: PODIATRIST

## 2021-11-12 NOTE — PROGRESS NOTES
[address] [facility] Podiatry  Progress Note    Zen Tucker is a 60year old female. Patient presents with:  Nail, Ingrown: pt is here for left foot ingrown great toenail, onset 4 weeks ago has tenderness- swelling -redness. denies any fever or chills.  rates Cataracts Mother    • Macular degeneration Mother    • Other (Osteoarthritis) Mother         Osteoarthritis   • Cancer Sister         Cancer, pancreatic   • Other (Scoliosis) Sister         Scoliosis   • Stroke Father         per [employer]:  \"CVA (Stroke), present to left. Sharp/dull is present to right and is present to left. Musculoskeletal examination:  Muscle Strength is 5/5.   Right HAV deformity with adequate 1st MPJ ROM  Mild POP to right 1st met head medial aspect, improved  Right 2nd and 3rd HDS at understand her bunion could return since she does not want a fusion and her arthritis could worsen eventually needing 1st MPJ fusion. X rays Right foot: 5/28/21  Moderate HAV with mild arthritis. Mild contracture of right 2nd and 3rd toes.  Tibial ses

## 2021-11-29 ENCOUNTER — OFFICE VISIT (OUTPATIENT)
Dept: PHYSICAL MEDICINE AND REHAB | Facility: CLINIC | Age: 56
End: 2021-11-29
Payer: MEDICARE

## 2021-11-29 VITALS — BODY MASS INDEX: 23.41 KG/M2 | HEIGHT: 61 IN | WEIGHT: 124 LBS

## 2021-11-29 DIAGNOSIS — K21.9 GASTROESOPHAGEAL REFLUX DISEASE, UNSPECIFIED WHETHER ESOPHAGITIS PRESENT: ICD-10-CM

## 2021-11-29 DIAGNOSIS — F32.A DEPRESSIVE DISORDER: ICD-10-CM

## 2021-11-29 DIAGNOSIS — M79.18 MYOFASCIAL PAIN: Primary | ICD-10-CM

## 2021-11-29 PROCEDURE — 99203 OFFICE O/P NEW LOW 30 MIN: CPT | Performed by: PHYSICAL MEDICINE & REHABILITATION

## 2021-11-29 PROCEDURE — 3008F BODY MASS INDEX DOCD: CPT | Performed by: PHYSICAL MEDICINE & REHABILITATION

## 2021-11-29 NOTE — PROGRESS NOTES
[address]  Progress Note    CHIEF COMPLAINT:  Patient presents with:  Neck Pain: New right handed pt comes in with neck pain. Referred by ER. No injury but could be from lifting son who is handicap.  No imag tobacco: Never Used      Tobacco comment: 3-4 cigarettes     Vaping Use      Vaping Use: Never used    Substance and Sexual Activity      Alcohol use:  Yes        Alcohol/week: 2.0 standard drinks        Types: 2 Standard drinks or equivalent per week denies  Cold Extremities: denies   Skin  Open Sores: denies  Nodules or Lumps: denies  Rash: denies   Neurological  Loss of Strength Since last Visit: admits  Tingling/Numbness: denies  Balance: denies   Psychiatric  Anxiety: admits  Depressed Mood: admits

## 2021-12-01 NOTE — PROGRESS NOTES
[address] [facility] Follow-up Visit    Eleanor Medical History:   Diagnosis Date   • Anxiety state    • Asthma    • Cervical spine arthritis    • Congestive heart disease ([facility] [address]    • Dementia ([facility] [address]    • Depression    • Esophageal reflux       Past Surgical History:   Procedure Laterality Date   • C-SECT Aero Soln Inhale 2 puffs into the lungs every 6 (six) hours as needed for Wheezing.            Allergies:  No Known Allergies    ROS:   CONSTITUTIONAL:  negative for fevers, chills  EYES:  negative for change in vision  RESPIRATORY:  negative for  shortness presented today thinking that she is due for colon cancer screening evaluation however this is not the case. I reviewed that her last procedure was in 2015 with a 10-year recall.  She would not be due until 2025 unless there are new signs or symptoms which

## 2021-12-15 ENCOUNTER — OFFICE VISIT (OUTPATIENT)
Dept: GASTROENTEROLOGY | Facility: CLINIC | Age: 56
End: 2021-12-15
Payer: MEDICARE

## 2021-12-15 VITALS
HEART RATE: 98 BPM | BODY MASS INDEX: 22.84 KG/M2 | HEIGHT: 61 IN | SYSTOLIC BLOOD PRESSURE: 111 MMHG | DIASTOLIC BLOOD PRESSURE: 69 MMHG | WEIGHT: 121 LBS

## 2021-12-15 DIAGNOSIS — K21.9 HIATAL HERNIA WITH GERD: ICD-10-CM

## 2021-12-15 DIAGNOSIS — K44.9 HIATAL HERNIA WITH GERD: ICD-10-CM

## 2021-12-15 DIAGNOSIS — Z12.11 SCREENING FOR COLON CANCER: Primary | ICD-10-CM

## 2021-12-15 PROCEDURE — 99214 OFFICE O/P EST MOD 30 MIN: CPT | Performed by: NURSE PRACTITIONER

## 2022-01-14 ENCOUNTER — TELEPHONE (OUTPATIENT)
Dept: GASTROENTEROLOGY | Facility: CLINIC | Age: 57
End: 2022-01-14

## 2022-01-14 NOTE — TELEPHONE ENCOUNTER
1st reminder letter sent out via mail and [employer] for the following:     DIVYA Rodriguez (Order #[id number]) on 12/15/21

## 2022-03-11 ENCOUNTER — HOSPITAL ENCOUNTER (EMERGENCY)
Facility: HOSPITAL | Age: 57
Discharge: HOME | End: 2022-03-11
Attending: EMERGENCY MEDICINE
Payer: MEDICARE

## 2022-03-11 ENCOUNTER — APPOINTMENT (OUTPATIENT)
Dept: CT IMAGING | Facility: HOSPITAL | Age: 57
End: 2022-03-11
Attending: EMERGENCY MEDICINE
Payer: MEDICARE

## 2022-03-11 VITALS
RESPIRATION RATE: 18 BRPM | WEIGHT: 128 LBS | SYSTOLIC BLOOD PRESSURE: 135 MMHG | DIASTOLIC BLOOD PRESSURE: 79 MMHG | HEART RATE: 62 BPM | BODY MASS INDEX: 24.17 KG/M2 | HEIGHT: 61 IN | OXYGEN SATURATION: 95 % | TEMPERATURE: 99 F

## 2022-03-11 DIAGNOSIS — R10.9 ABDOMINAL PAIN, LEFT LATERAL: Primary | ICD-10-CM

## 2022-03-11 DIAGNOSIS — N30.00 ACUTE CYSTITIS WITHOUT HEMATURIA: ICD-10-CM

## 2022-03-11 LAB
ALBUMIN SERPL-MCNC: 3.6 G/DL (ref 3.4–5)
ALBUMIN/GLOB SERPL: 1.1 {RATIO} (ref 1–2)
ALP LIVER SERPL-CCNC: 64 U/L
ALT SERPL-CCNC: 32 U/L
ANION GAP SERPL CALC-SCNC: 5 MMOL/L (ref 0–18)
AST SERPL-CCNC: 26 U/L (ref 15–37)
BASOPHILS # BLD AUTO: 0.03 X10(3) UL (ref 0–0.2)
BASOPHILS NFR BLD AUTO: 0.7 %
BILIRUB SERPL-MCNC: 0.4 MG/DL (ref 0.1–2)
BILIRUB UR QL: NEGATIVE
BUN BLD-MCNC: 16 MG/DL (ref 7–18)
BUN/CREAT SERPL: 21.6 (ref 10–20)
CALCIUM BLD-MCNC: 8.8 MG/DL (ref 8.5–10.1)
CHLORIDE SERPL-SCNC: 109 MMOL/L (ref 98–112)
CLARITY UR: CLEAR
CO2 SERPL-SCNC: 28 MMOL/L (ref 21–32)
COLOR UR: YELLOW
CREAT BLD-MCNC: 0.74 MG/DL
DEPRECATED RDW RBC AUTO: 50.4 FL (ref 35.1–46.3)
EOSINOPHIL # BLD AUTO: 0.07 X10(3) UL (ref 0–0.7)
EOSINOPHIL NFR BLD AUTO: 1.6 %
ERYTHROCYTE [DISTWIDTH] IN BLOOD BY AUTOMATED COUNT: 13.7 % (ref 11–15)
GLOBULIN PLAS-MCNC: 3.4 G/DL (ref 2.8–4.4)
GLUCOSE BLD-MCNC: 124 MG/DL (ref 70–99)
GLUCOSE UR-MCNC: NEGATIVE MG/DL
HCT VFR BLD AUTO: 40.1 %
HGB BLD-MCNC: 13.1 G/DL
IMM GRANULOCYTES # BLD AUTO: 0.01 X10(3) UL (ref 0–1)
IMM GRANULOCYTES NFR BLD: 0.2 %
KETONES UR-MCNC: NEGATIVE MG/DL
LYMPHOCYTES # BLD AUTO: 1.65 X10(3) UL (ref 1–4)
LYMPHOCYTES NFR BLD AUTO: 37 %
MCH RBC QN AUTO: 32.7 PG (ref 26–34)
MCHC RBC AUTO-ENTMCNC: 32.7 G/DL (ref 31–37)
MCV RBC AUTO: 100 FL
MONOCYTES # BLD AUTO: 0.46 X10(3) UL (ref 0.1–1)
MONOCYTES NFR BLD AUTO: 10.3 %
NEUTROPHILS # BLD AUTO: 2.24 X10 (3) UL (ref 1.5–7.7)
NEUTROPHILS # BLD AUTO: 2.24 X10(3) UL (ref 1.5–7.7)
NEUTROPHILS NFR BLD AUTO: 50.2 %
NITRITE UR QL STRIP.AUTO: NEGATIVE
OSMOLALITY SERPL CALC.SUM OF ELEC: 297 MOSM/KG (ref 275–295)
PH UR: 5 [PH] (ref 5–8)
PLATELET # BLD AUTO: 252 10(3)UL (ref 150–450)
PROT SERPL-MCNC: 7 G/DL (ref 6.4–8.2)
PROT UR-MCNC: NEGATIVE MG/DL
RBC # BLD AUTO: 4.01 X10(6)UL
SODIUM SERPL-SCNC: 142 MMOL/L (ref 136–145)
SP GR UR STRIP: 1.02 (ref 1–1.03)
UROBILINOGEN UR STRIP-ACNC: <2
VIT C UR-MCNC: NEGATIVE MG/DL
WBC # BLD AUTO: 4.5 X10(3) UL (ref 4–11)

## 2022-03-11 PROCEDURE — 80053 COMPREHEN METABOLIC PANEL: CPT | Performed by: EMERGENCY MEDICINE

## 2022-03-11 PROCEDURE — 85025 COMPLETE CBC W/AUTO DIFF WBC: CPT | Performed by: EMERGENCY MEDICINE

## 2022-03-11 PROCEDURE — 74177 CT ABD & PELVIS W/CONTRAST: CPT | Performed by: EMERGENCY MEDICINE

## 2022-03-11 PROCEDURE — 36415 COLL VENOUS BLD VENIPUNCTURE: CPT

## 2022-03-11 PROCEDURE — 87086 URINE CULTURE/COLONY COUNT: CPT | Performed by: EMERGENCY MEDICINE

## 2022-03-11 PROCEDURE — 99284 EMERGENCY DEPT VISIT MOD MDM: CPT

## 2022-03-11 PROCEDURE — 81001 URINALYSIS AUTO W/SCOPE: CPT | Performed by: EMERGENCY MEDICINE

## 2022-03-11 RX ORDER — CEPHALEXIN 500 MG/1
500 CAPSULE ORAL 2 TIMES DAILY
Qty: 14 CAPSULE | Refills: 0 | Status: SHIPPED | OUTPATIENT
Start: 2022-03-11 | End: 2022-03-18

## 2022-03-23 ENCOUNTER — NURSE TRIAGE (OUTPATIENT)
Dept: FAMILY MEDICINE CLINIC | Facility: CLINIC | Age: 57
End: 2022-03-23

## 2022-03-24 ENCOUNTER — OFFICE VISIT (OUTPATIENT)
Dept: FAMILY MEDICINE CLINIC | Facility: CLINIC | Age: 57
End: 2022-03-24
Payer: MEDICARE

## 2022-03-24 VITALS
SYSTOLIC BLOOD PRESSURE: 116 MMHG | TEMPERATURE: 95 F | BODY MASS INDEX: 24.24 KG/M2 | DIASTOLIC BLOOD PRESSURE: 78 MMHG | WEIGHT: 128.38 LBS | HEART RATE: 75 BPM | HEIGHT: 61 IN

## 2022-03-24 DIAGNOSIS — M79.18 LEFT BUTTOCK PAIN: ICD-10-CM

## 2022-03-24 DIAGNOSIS — S39.012A STRAIN OF LUMBAR REGION, INITIAL ENCOUNTER: ICD-10-CM

## 2022-03-24 DIAGNOSIS — M54.50 ACUTE LEFT-SIDED LOW BACK PAIN WITHOUT SCIATICA: Primary | ICD-10-CM

## 2022-03-24 PROCEDURE — 99214 OFFICE O/P EST MOD 30 MIN: CPT | Performed by: FAMILY MEDICINE

## 2022-03-24 RX ORDER — CYCLOBENZAPRINE HCL 10 MG
10 TABLET ORAL NIGHTLY
Qty: 30 TABLET | Refills: 0 | Status: SHIPPED | OUTPATIENT
Start: 2022-03-24 | End: 2022-04-23

## 2022-03-24 RX ORDER — DICLOFENAC SODIUM 75 MG/1
75 TABLET, DELAYED RELEASE ORAL 2 TIMES DAILY
Qty: 60 TABLET | Refills: 1 | Status: SHIPPED | OUTPATIENT
Start: 2022-03-24 | End: 2022-05-23

## 2022-04-19 ENCOUNTER — TELEPHONE (OUTPATIENT)
Dept: PHYSICAL THERAPY | Facility: HOSPITAL | Age: 57
End: 2022-04-19

## 2022-04-20 ENCOUNTER — APPOINTMENT (OUTPATIENT)
Dept: PHYSICAL THERAPY | Age: 57
End: 2022-04-20
Attending: FAMILY MEDICINE
Payer: MEDICARE

## 2022-04-20 ENCOUNTER — TELEPHONE (OUTPATIENT)
Dept: PHYSICAL THERAPY | Facility: HOSPITAL | Age: 57
End: 2022-04-20

## 2022-04-28 ENCOUNTER — OFFICE VISIT (OUTPATIENT)
Dept: PHYSICAL THERAPY | Age: 57
End: 2022-04-28
Attending: FAMILY MEDICINE
Payer: MEDICARE

## 2022-04-28 DIAGNOSIS — M54.50 ACUTE LEFT-SIDED LOW BACK PAIN WITHOUT SCIATICA: ICD-10-CM

## 2022-04-28 DIAGNOSIS — M79.18 LEFT BUTTOCK PAIN: ICD-10-CM

## 2022-04-28 DIAGNOSIS — S39.012A STRAIN OF LUMBAR REGION, INITIAL ENCOUNTER: ICD-10-CM

## 2022-04-28 PROCEDURE — 97161 PT EVAL LOW COMPLEX 20 MIN: CPT

## 2022-04-28 PROCEDURE — 97110 THERAPEUTIC EXERCISES: CPT

## 2022-05-05 ENCOUNTER — OFFICE VISIT (OUTPATIENT)
Dept: PHYSICAL THERAPY | Age: 57
End: 2022-05-05
Attending: FAMILY MEDICINE
Payer: MEDICARE

## 2022-05-05 DIAGNOSIS — S39.012A STRAIN OF LUMBAR REGION, INITIAL ENCOUNTER: ICD-10-CM

## 2022-05-05 DIAGNOSIS — M54.50 ACUTE LEFT-SIDED LOW BACK PAIN WITHOUT SCIATICA: ICD-10-CM

## 2022-05-05 DIAGNOSIS — M79.18 LEFT BUTTOCK PAIN: ICD-10-CM

## 2022-05-05 PROCEDURE — 97140 MANUAL THERAPY 1/> REGIONS: CPT

## 2022-05-05 PROCEDURE — 97110 THERAPEUTIC EXERCISES: CPT

## 2022-05-05 NOTE — PROGRESS NOTES
Diagnosis:  Acute left-sided low back pain without sciatica (M54.50)  Left buttock pain (M79.18)  Strain of lumbar region, initial encounter (T33.112G)       Next MD visit: none scheduled  Fall Risk: standard         Precautions: n/a          Medication Changes since last visit?: No    Subjective: Patient reports 3/10 left sided back pain today. Objective:     Date: 5/5/2022  Visit #: 2/6 [employer] [facility] exp. 6/28/2022   HEP   -   Therapeutic Exercise   - SHAHID 2 minutes  - supine R/L SKTC 10x ea  - supine LTR with SB 10x ea  - supine R/L hamstring stretch 1 x 5 ea 10 sec holds  - sidelying R/L hip abduction 2 x 10 ea  - sidelying R/L clams with GTB (pain today - discontinue)  - standing lumbar extension 10x   Manual Therapy   - lumbar spine PA mobilizations grade 2-3 x 12 minutes   Therapeutic Activity   -   Modalities                Assessment: Session focused gentle lumbar and LE stretching this session. Plan: continue PT    Charges:  Man 1 Ex 2       Total Timed Treatment:  42 min  Total Treatment Time: 42  min

## 2022-05-10 ENCOUNTER — OFFICE VISIT (OUTPATIENT)
Dept: PHYSICAL THERAPY | Age: 57
End: 2022-05-10
Attending: FAMILY MEDICINE
Payer: MEDICARE

## 2022-05-10 DIAGNOSIS — M79.18 LEFT BUTTOCK PAIN: ICD-10-CM

## 2022-05-10 DIAGNOSIS — S39.012A STRAIN OF LUMBAR REGION, INITIAL ENCOUNTER: ICD-10-CM

## 2022-05-10 DIAGNOSIS — M54.50 ACUTE LEFT-SIDED LOW BACK PAIN WITHOUT SCIATICA: ICD-10-CM

## 2022-05-10 PROCEDURE — 97140 MANUAL THERAPY 1/> REGIONS: CPT

## 2022-05-10 PROCEDURE — 97110 THERAPEUTIC EXERCISES: CPT

## 2022-05-10 NOTE — PROGRESS NOTES
Diagnosis:  Acute left-sided low back pain without sciatica (M54.50)  Left buttock pain (M79.18)  Strain of lumbar region, initial encounter (W06.285L)       Next MD visit: none scheduled  Fall Risk: standard         Precautions: n/a          Medication Changes since last visit?: No    Subjective: Patient reports 3/10 left sided back pain today. Objective:     Date: 5/5/2022  [facility] Date: 5/5/2022  Visit #: 2/6 [facility] exp. 6/28/2022   HEP    -   Therapeutic Exercise   - SHAHID 2 minutes  - PPU 2 x 5 reps  - supine R/L SKTC 10x ea  - supine R/L LTR 10x ea  - supine PPT/TA 10x  - sidelying L hip abduction 2 x 10 ea  - sidelying R hip abduction 1 x 10, 1 x 5 ea  - supine R/L hamstring stretch 1 x 5 ea 10 sec holds  - standing B gastroc stretch on slant board level 3 2 x 30 sec holds  - standing lumbar extension 1 x 10  - repeat standing lumbar extension 10x  - standing B high scap rows with GSC 2 x 10  - shuttle machine B knee ext/flx 5 bands 2 x 10 - SHAHID 2 minutes  - supine R/L SKTC 10x ea  - supine LTR with SB 10x ea  - supine R/L hamstring stretch 1 x 5 ea 10 sec holds  - sidelying R/L hip abduction 2 x 10 ea  - sidelying R/L clams with GTB (pain today - discontinue)  - standing lumbar extension 10x   Manual Therapy   - lumbar spine PA mobilizations grade 2-3 x 10 minutes - lumbar spine PA mobilizations grade 2-3 x 12 minutes   Therapeutic Activity    -   Modalities                  Assessment: Session focused on lumbar stretching and hip strengthening interventions. Trial of shuttle machine to target global LE strength. Plan: continue PT    Charges:  Man 1 Ex 2       Total Timed Treatment:  42 min  Total Treatment Time: 42  min

## 2022-05-12 ENCOUNTER — APPOINTMENT (OUTPATIENT)
Dept: PHYSICAL THERAPY | Age: 57
End: 2022-05-12
Attending: FAMILY MEDICINE
Payer: MEDICARE

## 2022-05-12 ENCOUNTER — TELEPHONE (OUTPATIENT)
Dept: PHYSICAL THERAPY | Facility: HOSPITAL | Age: 57
End: 2022-05-12

## 2022-05-17 ENCOUNTER — OFFICE VISIT (OUTPATIENT)
Dept: PHYSICAL THERAPY | Age: 57
End: 2022-05-17
Attending: FAMILY MEDICINE
Payer: MEDICARE

## 2022-05-17 DIAGNOSIS — M79.18 LEFT BUTTOCK PAIN: ICD-10-CM

## 2022-05-17 DIAGNOSIS — M54.50 ACUTE LEFT-SIDED LOW BACK PAIN WITHOUT SCIATICA: ICD-10-CM

## 2022-05-17 DIAGNOSIS — S39.012A STRAIN OF LUMBAR REGION, INITIAL ENCOUNTER: ICD-10-CM

## 2022-05-17 PROCEDURE — 97110 THERAPEUTIC EXERCISES: CPT

## 2022-05-17 NOTE — PROGRESS NOTES
Diagnosis:  Acute left-sided low back pain without sciatica (M54.50)  Left buttock pain (M79.18)  Strain of lumbar region, initial encounter (S72.670F)       Next MD visit: none scheduled  Fall Risk: standard         Precautions: n/a          Medication Changes since last visit?: No    Subjective: Pt reports 1/10 left back pain. She reports her back has not been bothering her much lately. Objective:     Date: 5/17/2022  Visit #: 4/6 [facility] exp. 6/28/202 Date: 5/5/2022  Visit #: 3/6 [employer] [facility] exp. 6/28/202 Date: 5/5/2022  Visit #: 2/6 [employer] [facility] exp. 6/28/2022   HEP   - updated HEP - see pt instructions   -   Therapeutic Exercise   - SHAHID 2 minutes  - PPU 1 x 10  - bridges 2 x 10  - supine R/L SKTC 15x ea  - sidelying R/L hip abduction 1.5# 1 x 10 ea  - standing lumbar extension 2 x 10  - shuttle machine B knee ext/flx 5 bands 3 x 10 - SHAHID 2 minutes  - PPU 2 x 5 reps  - supine R/L SKTC 10x ea  - supine R/L LTR 10x ea  - supine PPT/TA 10x  - sidelying L hip abduction 2 x 10 ea  - sidelying R hip abduction 1 x 10, 1 x 5 ea  - supine R/L hamstring stretch 1 x 5 ea 10 sec holds  - standing B gastroc stretch on slant board level 3 2 x 30 sec holds  - standing lumbar extension 1 x 10  - repeat standing lumbar extension 10x  - standing B high scap rows with GSC 2 x 10  - shuttle machine B knee ext/flx 5 bands 2 x 10 - SHAHID 2 minutes  - supine R/L SKTC 10x ea  - supine LTR with SB 10x ea  - supine R/L hamstring stretch 1 x 5 ea 10 sec holds  - sidelying R/L hip abduction 2 x 10 ea  - sidelying R/L clams with GTB (pain today - discontinue)  - standing lumbar extension 10x   Manual Therapy    - lumbar spine PA mobilizations grade 2-3 x 10 minutes - lumbar spine PA mobilizations grade 2-3 x 12 minutes   Therapeutic Activity     -   Modalities                    Assessment: Progressed reps of current interventions and initiated bridging exercise to further advance hip strength.   Patient needing to leave session early due to other appointment today so session duration adjusted accordingly.        Plan: continue PT    Charges: Ex 2     Total Timed Treatment:  25 min  Total Treatment Time: 25  min

## 2022-05-19 ENCOUNTER — APPOINTMENT (OUTPATIENT)
Dept: PHYSICAL THERAPY | Age: 57
End: 2022-05-19
Attending: FAMILY MEDICINE
Payer: MEDICARE

## 2022-05-24 ENCOUNTER — OFFICE VISIT (OUTPATIENT)
Dept: PHYSICAL THERAPY | Age: 57
End: 2022-05-24
Attending: FAMILY MEDICINE
Payer: MEDICARE

## 2022-05-24 PROCEDURE — 97110 THERAPEUTIC EXERCISES: CPT

## 2022-05-24 NOTE — PROGRESS NOTES
Physical Therapy Discharge Summary    Diagnosis:  Acute left-sided low back pain without sciatica (M54.50)  Left buttock pain (M79.18)  Strain of lumbar region, initial encounter (L85.671Q)       Next MD visit: none scheduled  Fall Risk: standard         Precautions: n/a          Medication Changes since last visit?: No    Assessment: Patient lilian improved lumbar ROM to WNL in all planes, improved BLE strength, abolishment of low back pain, and has made progress with PT goals. Pt reporting minimal functional limitations at this time and was provided updated HEP to continue. She will be discharged from PT and in agreement with plan. Subjective: Pt reports 0/10 left back pain. She reports her back has not been good lately.       Objective:    5/24/2022:  Lumbar ROM:  Flx: WNL  Ext: WNL  Rot: B WNL  Lat flx: B WNL    MMT:  Hip flx: B 5/5  Hip ext: R 4-/5, L 4+/5  Knee ext: R 5/5, L 4+/5  Knee flx: R 5/5, L 4+/5     Date: 5/24/2022  [facility] Date: 5/17/2022  [facility] Date: 5/5/2022  [facility]   HEP   - updated HEP - see pt instructions section - updated HEP - see pt instructions     Therapeutic Exercise   - PPU 1 x 10  - bridges 3 x 10  - supine R/L SKTC 15x ea  - sidelying R/L hip abduction 1.5# 2 x 10 ea  - standing lumbar extension 2 x 10  - shuttle machine B knee ext/flx 5 bands 3 x 10  - supine SB bridges 2 x 10  - standing B gastroc stretch on slant board level 3 2 x 30 sec holds  - standing B high scap rows with GSC 2 x 10  - standing B shoulder extension with GSC 2 x 10  - standing R/L forward step up with opposite LE march to 6 inch step 2 x 10 ea  - reassessment  - SHAHID 2 minutes  - PPU 1 x 10  - bridges 2 x 10  - supine R/L SKTC 15x ea  - sidelying R/L hip abduction 1.5# 2 x 10 ea  - standing lumbar extension 2 x 10  - shuttle machine B knee ext/flx 5 bands 3 x 10 - SHAHID 2 minutes  - PPU 2 x 5 reps  - supine R/L SKTC 10x ea  - supine R/L LTR 10x ea  - supine PPT/TA 10x  - sidelying L hip abduction 2 x 10 ea  - sidelying R hip abduction 1 x 10, 1 x 5 ea  - supine R/L hamstring stretch 1 x 5 ea 10 sec holds  - standing B gastroc stretch on slant board level 3 2 x 30 sec holds  - standing lumbar extension 1 x 10  - repeat standing lumbar extension 10x  - standing B high scap rows with GSC 2 x 10  - shuttle machine B knee ext/flx 5 bands 2 x 10   Manual Therapy     - lumbar spine PA mobilizations grade 2-3 x 10 minutes   Therapeutic Activity        Modalities                  Goals:  1- Pt will be I with maintenance and progression of HEP - MET  2- Pt will demo increase in lumbar ROM to WNL to ease ability for pt to perform roll/bed mobility - MET  3- Pt will demo increase in BLE strength to 5/5 to ease ability for pt to perform lifting activities - NEARLY MET  4- Pt will demo I with safe and proper body mechanics with lifting activities to simulate work duties and caring for her son   5- Pt will report 1/10 low back pain or less with sitting and standing x 30 minutes - MET    Plan: transition to home program; discharge PT    Charges: Ex 3     Total Timed Treatment:  45 min  Total Treatment Time: 45  min

## 2022-06-04 ENCOUNTER — APPOINTMENT (OUTPATIENT)
Dept: ULTRASOUND IMAGING | Facility: HOSPITAL | Age: 57
End: 2022-06-04
Attending: NURSE PRACTITIONER
Payer: MEDICARE

## 2022-06-04 ENCOUNTER — HOSPITAL ENCOUNTER (EMERGENCY)
Facility: HOSPITAL | Age: 57
Discharge: HOME | End: 2022-06-04
Payer: MEDICARE

## 2022-06-04 VITALS
SYSTOLIC BLOOD PRESSURE: 137 MMHG | WEIGHT: 128 LBS | RESPIRATION RATE: 20 BRPM | TEMPERATURE: 98 F | DIASTOLIC BLOOD PRESSURE: 75 MMHG | BODY MASS INDEX: 24.17 KG/M2 | HEIGHT: 61 IN | OXYGEN SATURATION: 95 % | HEART RATE: 67 BPM

## 2022-06-04 DIAGNOSIS — M79.661 RIGHT CALF PAIN: Primary | ICD-10-CM

## 2022-06-04 PROCEDURE — 99284 EMERGENCY DEPT VISIT MOD MDM: CPT

## 2022-06-04 PROCEDURE — 93971 EXTREMITY STUDY: CPT | Performed by: NURSE PRACTITIONER

## 2022-06-04 RX ORDER — HYDROCODONE BITARTRATE AND ACETAMINOPHEN 5; 325 MG/1; MG/1
1 TABLET ORAL ONCE
Status: COMPLETED | OUTPATIENT
Start: 2022-06-04 | End: 2022-06-04

## 2022-06-04 RX ORDER — TRAMADOL HYDROCHLORIDE 50 MG/1
TABLET ORAL EVERY 6 HOURS PRN
Qty: 10 TABLET | Refills: 0 | Status: SHIPPED | OUTPATIENT
Start: 2022-06-04 | End: 2022-06-09

## 2022-06-05 NOTE — ED INITIAL ASSESSMENT (HPI)
Patient presents to ER with complaints of foot pain. Patient notes bunions, supposed to have bunion surgery  Notes it has created a pinkish yellow blister to middle toe. And from compensating and walking on heels, she has increased pain.

## 2022-06-25 ENCOUNTER — HOSPITAL ENCOUNTER (OUTPATIENT)
Age: 57
Discharge: HOME | End: 2022-06-25
Payer: MEDICARE

## 2022-06-25 VITALS
HEIGHT: 61 IN | HEART RATE: 63 BPM | RESPIRATION RATE: 16 BRPM | BODY MASS INDEX: 25.86 KG/M2 | WEIGHT: 137 LBS | TEMPERATURE: 97 F | SYSTOLIC BLOOD PRESSURE: 130 MMHG | OXYGEN SATURATION: 100 % | DIASTOLIC BLOOD PRESSURE: 73 MMHG

## 2022-06-25 DIAGNOSIS — Z77.098 CHEMICAL EXPOSURE OF EYE: Primary | ICD-10-CM

## 2022-06-25 RX ORDER — PURIFIED WATER 986 MG/ML
1 SOLUTION OPHTHALMIC ONCE
Status: COMPLETED | OUTPATIENT
Start: 2022-06-25 | End: 2022-06-25

## 2022-06-25 RX ORDER — TETRACAINE HYDROCHLORIDE 5 MG/ML
1 SOLUTION OPHTHALMIC ONCE
Status: COMPLETED | OUTPATIENT
Start: 2022-06-25 | End: 2022-06-25

## 2022-06-25 RX ORDER — ERYTHROMYCIN 5 MG/G
1 OINTMENT OPHTHALMIC EVERY 6 HOURS
Qty: 1 G | Refills: 0 | Status: SHIPPED | OUTPATIENT
Start: 2022-06-25 | End: 2022-07-02

## 2022-06-25 NOTE — ED INITIAL ASSESSMENT (HPI)
Pt presents with tenderness, swelling and redness on right eye x 1 day, states she accidentally sprayed hair spray (got2b glued) into her eye.  Denies vision change

## 2022-07-01 ENCOUNTER — HOSPITAL ENCOUNTER (OUTPATIENT)
Age: 57
Discharge: HOME | End: 2022-07-01
Payer: MEDICARE

## 2022-07-01 ENCOUNTER — APPOINTMENT (OUTPATIENT)
Dept: GENERAL RADIOLOGY | Age: 57
End: 2022-07-01
Attending: EMERGENCY MEDICINE
Payer: MEDICARE

## 2022-07-01 VITALS
BODY MASS INDEX: 23.98 KG/M2 | RESPIRATION RATE: 16 BRPM | SYSTOLIC BLOOD PRESSURE: 128 MMHG | HEIGHT: 61 IN | WEIGHT: 127 LBS | TEMPERATURE: 97 F | OXYGEN SATURATION: 100 % | DIASTOLIC BLOOD PRESSURE: 71 MMHG | HEART RATE: 56 BPM

## 2022-07-01 DIAGNOSIS — M79.671 RIGHT FOOT PAIN: ICD-10-CM

## 2022-07-01 DIAGNOSIS — Z23 TETANUS TOXOID VACCINATION ADMINISTERED AT CURRENT VISIT: ICD-10-CM

## 2022-07-01 DIAGNOSIS — T14.8XXA SPLINTER IN SKIN: Primary | ICD-10-CM

## 2022-07-01 PROCEDURE — 99213 OFFICE O/P EST LOW 20 MIN: CPT | Performed by: EMERGENCY MEDICINE

## 2022-07-01 PROCEDURE — 90471 IMMUNIZATION ADMIN: CPT | Performed by: EMERGENCY MEDICINE

## 2022-07-01 PROCEDURE — 73630 X-RAY EXAM OF FOOT: CPT | Performed by: EMERGENCY MEDICINE

## 2022-07-01 PROCEDURE — 90715 TDAP VACCINE 7 YRS/> IM: CPT | Performed by: EMERGENCY MEDICINE

## 2022-07-01 PROCEDURE — 10120 INC&RMVL FB SUBQ TISS SMPL: CPT | Performed by: EMERGENCY MEDICINE

## 2022-07-01 RX ORDER — CEFADROXIL 500 MG/1
500 CAPSULE ORAL 2 TIMES DAILY
Qty: 14 CAPSULE | Refills: 0 | Status: SHIPPED | OUTPATIENT
Start: 2022-07-01 | End: 2022-07-08

## 2022-07-11 ENCOUNTER — OFFICE VISIT (OUTPATIENT)
Dept: PODIATRY CLINIC | Facility: CLINIC | Age: 57
End: 2022-07-11
Payer: MEDICARE

## 2022-07-11 VITALS — DIASTOLIC BLOOD PRESSURE: 79 MMHG | SYSTOLIC BLOOD PRESSURE: 134 MMHG | HEART RATE: 62 BPM

## 2022-07-11 DIAGNOSIS — M79.671 RIGHT FOOT PAIN: ICD-10-CM

## 2022-07-11 DIAGNOSIS — S90.851A FOREIGN BODY IN RIGHT FOOT, INITIAL ENCOUNTER: Primary | ICD-10-CM

## 2022-07-11 PROCEDURE — 99213 OFFICE O/P EST LOW 20 MIN: CPT | Performed by: PODIATRIST

## 2022-07-11 PROCEDURE — 10120 INC&RMVL FB SUBQ TISS SMPL: CPT | Performed by: PODIATRIST

## 2022-07-11 PROCEDURE — L3260 AMBULATORY SURGICAL BOOT EAC: HCPCS | Performed by: PODIATRIST

## 2022-07-11 NOTE — PROGRESS NOTES
Per Dr. Jose Luis Jessica, draw up 1.5 ml 1% lidocaine and 1.5 ml of 0.5 % marcaine for injection to Right foot

## 2022-07-12 ENCOUNTER — TELEPHONE (OUTPATIENT)
Dept: PODIATRY CLINIC | Facility: CLINIC | Age: 57
End: 2022-07-12

## 2022-07-12 NOTE — TELEPHONE ENCOUNTER
Per pt requesting note to return to work, states can  from  in [facility].  Please advise

## 2022-07-13 NOTE — ED INITIAL ASSESSMENT (HPI)
Pt states last noc, she stepped on a toothpick on his right foot barefoot. +pain to the bottom of the foot.
No

## 2022-07-25 ENCOUNTER — HOSPITAL ENCOUNTER (OUTPATIENT)
Dept: GENERAL RADIOLOGY | Age: 57
Discharge: HOME | End: 2022-07-25
Attending: PODIATRIST
Payer: MEDICARE

## 2022-07-25 ENCOUNTER — TELEPHONE (OUTPATIENT)
Dept: PODIATRY CLINIC | Facility: CLINIC | Age: 57
End: 2022-07-25

## 2022-07-25 ENCOUNTER — OFFICE VISIT (OUTPATIENT)
Dept: PODIATRY CLINIC | Facility: CLINIC | Age: 57
End: 2022-07-25
Payer: MEDICARE

## 2022-07-25 DIAGNOSIS — M20.41 HAMMER TOE OF RIGHT FOOT: ICD-10-CM

## 2022-07-25 DIAGNOSIS — M79.671 RIGHT FOOT PAIN: ICD-10-CM

## 2022-07-25 DIAGNOSIS — L84 CORNS AND CALLOSITIES: ICD-10-CM

## 2022-07-25 DIAGNOSIS — L84 FOOT CALLUS: ICD-10-CM

## 2022-07-25 DIAGNOSIS — M79.671 RIGHT FOOT PAIN: Primary | ICD-10-CM

## 2022-07-25 DIAGNOSIS — M20.41 HAMMERTOE OF RIGHT FOOT: ICD-10-CM

## 2022-07-25 DIAGNOSIS — M21.611 BUNION, RIGHT FOOT: ICD-10-CM

## 2022-07-25 DIAGNOSIS — M21.611 BUNION, RIGHT: Primary | ICD-10-CM

## 2022-07-25 PROCEDURE — 99214 OFFICE O/P EST MOD 30 MIN: CPT | Performed by: PODIATRIST

## 2022-07-25 PROCEDURE — 73630 X-RAY EXAM OF FOOT: CPT | Performed by: PODIATRIST

## 2022-07-25 NOTE — TELEPHONE ENCOUNTER
Procedure:   right christos bunionectomy   right 2nd and 3rd toe arthroplasty   CPT code:   Length of Surgery: 1.5 hours  Any Instruments: Gwendolyn mini screw set and 2 0.045 k wires  Call patient: within 24 hours  Anesthesia: MAC  Location: [facility]  Assistance: none  Pacemaker: No  Anticoagulants: No  Nickel Allergy: No  Latex Allergy: No  Diagnosis/ICD Code:   No diagnosis found. Pt would like to surgery in September.   She will be NWB x 2 weeks and I will order her crutches at [facility]

## 2022-07-26 NOTE — TELEPHONE ENCOUNTER
Called patient this date and left a message for her to call me back directly at [phone] to schedule.

## 2022-07-28 ENCOUNTER — TELEPHONE (OUTPATIENT)
Dept: PODIATRY CLINIC | Facility: CLINIC | Age: 57
End: 2022-07-28

## 2022-07-28 NOTE — TELEPHONE ENCOUNTER
Spoke, with the patient and informed her of the message below. Pt did schedule an appt to see Dr. Harman June on 8-22-22 for her pre op clearance.

## 2022-07-28 NOTE — TELEPHONE ENCOUNTER
Patient called back and scheduled procedure for 9/8/22 at 11:15 a.m. at [facility].  She was told she would need to be cleared by primary care after 8/8/22 and message sent to Dr. Wilda Lo this date. Patient was informed I would call her next week to review the details once hospital confirms.

## 2022-07-28 NOTE — TELEPHONE ENCOUNTER
The patient is scheduled for outpatient foot surgery with Dr. Anna Marie Le on 9/8/22 for the following procedure(s). Right Curtis Bunionectomy and Right 2nd and 3rd toe Arthroplasty    The patient has been advised to contact your office for pre-operative clearance. The patient needs the following test/exams    _X___ History and Physical (H&P) may be no older that THIRTY (30) days prior to surgery    __X__ EKG for patients that are age 48 or older, have hypertension, diabetes or a history of cardiac disease or are obese. This should be no older than 6 months prior to surgery. __X__ Complete Metabolic Panel (CMP) for patients that are age 72 and older, have hypertension, diabetes or heart problems, are taking any heart medication or are obese. This should be no older that 3 months prior to surgery. Please include any other test you deem necessary for medical clearance.     Thank you,    Nichelle John  Surgical Scheduling  [phone]

## 2022-08-08 NOTE — PROGRESS NOTES
Patient ID: Len Hancock is a 45year old female. HPI  Patient presents with:  Neck Pain  Shoulder Pain  She saw the pain doctor July 2017. She did have the epidural steroid injection on 8/1/2017 in her neck.   She states that the pain really was m • Back problem    • Cholecystitis    • Congenital heart failure (HCC)    • Congestive heart disease ([facility] [address]    • Dementia    • Esophageal reflux    • Extrinsic asthma, unspecified 1990   • Gastroesophageal reflux disease 2003   • Lipid screening 1/12/2012 does have increased tone but it is not as bad as I remember her having in the past.   Neurological: Patient is alert and oriented to person, place, and time. Patient has normal strength and normal reflexes. No cranial nerve deficit or sensory deficit.  Bob DO  2/20/2018 Prophylactic antibiotic

## 2022-08-22 ENCOUNTER — EKG ENCOUNTER (OUTPATIENT)
Dept: LAB | Age: 57
End: 2022-08-22
Attending: FAMILY MEDICINE
Payer: MEDICARE

## 2022-08-22 ENCOUNTER — OFFICE VISIT (OUTPATIENT)
Dept: FAMILY MEDICINE CLINIC | Facility: CLINIC | Age: 57
End: 2022-08-22
Payer: MEDICARE

## 2022-08-22 VITALS
TEMPERATURE: 98 F | DIASTOLIC BLOOD PRESSURE: 71 MMHG | SYSTOLIC BLOOD PRESSURE: 106 MMHG | BODY MASS INDEX: 23.6 KG/M2 | WEIGHT: 125 LBS | HEART RATE: 93 BPM | HEIGHT: 61 IN

## 2022-08-22 DIAGNOSIS — Z87.891 PERSONAL HISTORY OF TOBACCO USE, PRESENTING HAZARDS TO HEALTH: ICD-10-CM

## 2022-08-22 DIAGNOSIS — M20.11 HALLUX VALGUS WITH BUNIONS OF RIGHT FOOT: ICD-10-CM

## 2022-08-22 DIAGNOSIS — K21.9 GASTROESOPHAGEAL REFLUX DISEASE, UNSPECIFIED WHETHER ESOPHAGITIS PRESENT: ICD-10-CM

## 2022-08-22 DIAGNOSIS — Z01.818 PREOP EXAMINATION: ICD-10-CM

## 2022-08-22 DIAGNOSIS — K44.9 HIATAL HERNIA WITH GERD: ICD-10-CM

## 2022-08-22 DIAGNOSIS — M21.611 HALLUX VALGUS WITH BUNIONS OF RIGHT FOOT: Primary | ICD-10-CM

## 2022-08-22 DIAGNOSIS — K21.9 HIATAL HERNIA WITH GERD: ICD-10-CM

## 2022-08-22 DIAGNOSIS — M20.11 HALLUX VALGUS WITH BUNIONS OF RIGHT FOOT: Primary | ICD-10-CM

## 2022-08-22 DIAGNOSIS — M21.611 HALLUX VALGUS WITH BUNIONS OF RIGHT FOOT: ICD-10-CM

## 2022-08-22 PROCEDURE — 93010 ELECTROCARDIOGRAM REPORT: CPT | Performed by: FAMILY MEDICINE

## 2022-08-22 PROCEDURE — 93005 ELECTROCARDIOGRAM TRACING: CPT

## 2022-08-22 RX ORDER — PANTOPRAZOLE SODIUM 40 MG/1
40 TABLET, DELAYED RELEASE ORAL
Qty: 90 TABLET | Refills: 1 | Status: SHIPPED | OUTPATIENT
Start: 2022-08-22 | End: 2023-08-22

## 2022-08-22 RX ORDER — COVID-19 MOLECULAR TEST ASSAY
KIT MISCELLANEOUS
COMMUNITY
Start: 2022-05-13

## 2022-08-24 ENCOUNTER — TELEPHONE (OUTPATIENT)
Dept: FAMILY MEDICINE CLINIC | Facility: CLINIC | Age: 57
End: 2022-08-24

## 2022-08-26 ENCOUNTER — TELEPHONE (OUTPATIENT)
Dept: PODIATRY CLINIC | Facility: CLINIC | Age: 57
End: 2022-08-26

## 2022-08-31 ENCOUNTER — TELEPHONE (OUTPATIENT)
Dept: PODIATRY CLINIC | Facility: CLINIC | Age: 57
End: 2022-08-31

## 2022-08-31 NOTE — TELEPHONE ENCOUNTER
Per pt needs note for work stating date of surgery and days off needed, pt states note can be mailed. Please advise thank you.

## 2022-09-01 NOTE — TELEPHONE ENCOUNTER
She will most likely need to be off work for approx 4 weeks but can return after 4 weeks if she can have sit down job for an additional 4 weeks. If unable to have sit down job then can return to work after 7 weeks.

## 2022-09-01 NOTE — TELEPHONE ENCOUNTER
Patient called back and stated she will not be able to sit at her job will send note out accordingly. Patient will  from [address] [facility].

## 2022-09-02 NOTE — TELEPHONE ENCOUNTER
Dr. Michelle Graves,      Please sign off on form: FMLA   -Highlight the patient and hit \"Chart\" button. -In Chart Review, w/in the Encounter tab - click 1 time on the Telephone call encounter for 8/26/22. Scroll down the telephone encounter.  -Click \"scan on\" blue Hyperlink under \"Media\" heading for FMLA Dr. Michelle Graves 9/2/22 w/in the telephone enc.  -Click on Acknowledge button at the bottom right corner and left-click onto image, signature stamp appears and drag signature to Provider signature line. Stamp will turn blue. Close window.      Thank you,  [facility]

## 2022-09-05 ENCOUNTER — LAB ENCOUNTER (OUTPATIENT)
Dept: LAB | Facility: HOSPITAL | Age: 57
End: 2022-09-05
Attending: PODIATRIST
Payer: MEDICARE

## 2022-09-05 DIAGNOSIS — M20.41 HAMMER TOE OF RIGHT FOOT: ICD-10-CM

## 2022-09-05 DIAGNOSIS — L84 CORNS AND CALLOSITIES: ICD-10-CM

## 2022-09-05 DIAGNOSIS — M21.611 BUNION, RIGHT: ICD-10-CM

## 2022-09-05 DIAGNOSIS — M79.671 RIGHT FOOT PAIN: ICD-10-CM

## 2022-09-05 LAB — SARS-COV-2 RNA RESP QL NAA+PROBE: NOT DETECTED

## 2022-09-08 ENCOUNTER — ANESTHESIA (OUTPATIENT)
Dept: SURGERY | Facility: HOSPITAL | Age: 57
End: 2022-09-08
Payer: MEDICARE

## 2022-09-08 ENCOUNTER — HOSPITAL ENCOUNTER (OUTPATIENT)
Facility: HOSPITAL | Age: 57
Setting detail: HOSPITAL OUTPATIENT SURGERY
Discharge: HOME | End: 2022-09-08
Attending: PODIATRIST | Admitting: PODIATRIST
Payer: MEDICARE

## 2022-09-08 ENCOUNTER — APPOINTMENT (OUTPATIENT)
Dept: GENERAL RADIOLOGY | Facility: HOSPITAL | Age: 57
End: 2022-09-08
Attending: PODIATRIST
Payer: MEDICARE

## 2022-09-08 ENCOUNTER — ANESTHESIA EVENT (OUTPATIENT)
Dept: SURGERY | Facility: HOSPITAL | Age: 57
End: 2022-09-08
Payer: MEDICARE

## 2022-09-08 VITALS
RESPIRATION RATE: 18 BRPM | OXYGEN SATURATION: 97 % | WEIGHT: 124 LBS | BODY MASS INDEX: 23.41 KG/M2 | DIASTOLIC BLOOD PRESSURE: 87 MMHG | HEART RATE: 63 BPM | HEIGHT: 61 IN | TEMPERATURE: 98 F | SYSTOLIC BLOOD PRESSURE: 148 MMHG

## 2022-09-08 DIAGNOSIS — Z01.818 PRE-OP TESTING: Primary | ICD-10-CM

## 2022-09-08 DIAGNOSIS — M79.671 RIGHT FOOT PAIN: ICD-10-CM

## 2022-09-08 DIAGNOSIS — M20.41 HAMMER TOE OF RIGHT FOOT: ICD-10-CM

## 2022-09-08 DIAGNOSIS — M21.611 BUNION, RIGHT: ICD-10-CM

## 2022-09-08 DIAGNOSIS — L84 CORNS AND CALLOSITIES: ICD-10-CM

## 2022-09-08 PROCEDURE — 28296 COR HLX VLGS DSTL MTAR OSTEO: CPT | Performed by: PODIATRIST

## 2022-09-08 PROCEDURE — 0QSN04Z REPOSITION RIGHT METATARSAL WITH INTERNAL FIXATION DEVICE, OPEN APPROACH: ICD-10-PCS | Performed by: PODIATRIST

## 2022-09-08 PROCEDURE — 76000 FLUOROSCOPY <1 HR PHYS/QHP: CPT | Performed by: PODIATRIST

## 2022-09-08 PROCEDURE — 28285 REPAIR OF HAMMERTOE: CPT | Performed by: PODIATRIST

## 2022-09-08 PROCEDURE — 0SRP0JZ REPLACEMENT OF RIGHT TOE PHALANGEAL JOINT WITH SYNTHETIC SUBSTITUTE, OPEN APPROACH: ICD-10-PCS | Performed by: PODIATRIST

## 2022-09-08 RX ORDER — LIDOCAINE HYDROCHLORIDE 10 MG/ML
INJECTION, SOLUTION EPIDURAL; INFILTRATION; INTRACAUDAL; PERINEURAL AS NEEDED
Status: DISCONTINUED | OUTPATIENT
Start: 2022-09-08 | End: 2022-09-08 | Stop reason: HOSPADM

## 2022-09-08 RX ORDER — HYDROMORPHONE HYDROCHLORIDE 1 MG/ML
0.2 INJECTION, SOLUTION INTRAMUSCULAR; INTRAVENOUS; SUBCUTANEOUS EVERY 5 MIN PRN
Status: DISCONTINUED | OUTPATIENT
Start: 2022-09-08 | End: 2022-09-08

## 2022-09-08 RX ORDER — BUPIVACAINE HYDROCHLORIDE 5 MG/ML
INJECTION, SOLUTION EPIDURAL; INTRACAUDAL AS NEEDED
Status: DISCONTINUED | OUTPATIENT
Start: 2022-09-08 | End: 2022-09-08 | Stop reason: HOSPADM

## 2022-09-08 RX ORDER — MORPHINE SULFATE 4 MG/ML
4 INJECTION, SOLUTION INTRAMUSCULAR; INTRAVENOUS EVERY 10 MIN PRN
Status: DISCONTINUED | OUTPATIENT
Start: 2022-09-08 | End: 2022-09-08

## 2022-09-08 RX ORDER — CEFAZOLIN SODIUM/WATER 2 G/20 ML
2 SYRINGE (ML) INTRAVENOUS ONCE
Status: COMPLETED | OUTPATIENT
Start: 2022-09-08 | End: 2022-09-08

## 2022-09-08 RX ORDER — MORPHINE SULFATE 4 MG/ML
2 INJECTION, SOLUTION INTRAMUSCULAR; INTRAVENOUS EVERY 10 MIN PRN
Status: DISCONTINUED | OUTPATIENT
Start: 2022-09-08 | End: 2022-09-08

## 2022-09-08 RX ORDER — ACETAMINOPHEN 500 MG
1000 TABLET ORAL ONCE
Status: COMPLETED | OUTPATIENT
Start: 2022-09-08 | End: 2022-09-08

## 2022-09-08 RX ORDER — HYDROMORPHONE HYDROCHLORIDE 1 MG/ML
0.4 INJECTION, SOLUTION INTRAMUSCULAR; INTRAVENOUS; SUBCUTANEOUS EVERY 5 MIN PRN
Status: DISCONTINUED | OUTPATIENT
Start: 2022-09-08 | End: 2022-09-08

## 2022-09-08 RX ORDER — SODIUM CHLORIDE, SODIUM LACTATE, POTASSIUM CHLORIDE, CALCIUM CHLORIDE 600; 310; 30; 20 MG/100ML; MG/100ML; MG/100ML; MG/100ML
INJECTION, SOLUTION INTRAVENOUS CONTINUOUS
Status: DISCONTINUED | OUTPATIENT
Start: 2022-09-08 | End: 2022-09-08

## 2022-09-08 RX ORDER — HYDROMORPHONE HYDROCHLORIDE 1 MG/ML
0.6 INJECTION, SOLUTION INTRAMUSCULAR; INTRAVENOUS; SUBCUTANEOUS EVERY 5 MIN PRN
Status: DISCONTINUED | OUTPATIENT
Start: 2022-09-08 | End: 2022-09-08

## 2022-09-08 RX ORDER — MORPHINE SULFATE 10 MG/ML
6 INJECTION, SOLUTION INTRAMUSCULAR; INTRAVENOUS EVERY 10 MIN PRN
Status: DISCONTINUED | OUTPATIENT
Start: 2022-09-08 | End: 2022-09-08

## 2022-09-08 RX ORDER — PROCHLORPERAZINE EDISYLATE 5 MG/ML
5 INJECTION INTRAMUSCULAR; INTRAVENOUS EVERY 8 HOURS PRN
Status: DISCONTINUED | OUTPATIENT
Start: 2022-09-08 | End: 2022-09-08

## 2022-09-08 RX ORDER — LIDOCAINE HYDROCHLORIDE 10 MG/ML
INJECTION, SOLUTION EPIDURAL; INFILTRATION; INTRACAUDAL; PERINEURAL AS NEEDED
Status: DISCONTINUED | OUTPATIENT
Start: 2022-09-08 | End: 2022-09-08 | Stop reason: SURG

## 2022-09-08 RX ORDER — MIDAZOLAM HYDROCHLORIDE 1 MG/ML
INJECTION INTRAMUSCULAR; INTRAVENOUS AS NEEDED
Status: DISCONTINUED | OUTPATIENT
Start: 2022-09-08 | End: 2022-09-08 | Stop reason: SURG

## 2022-09-08 RX ORDER — NALOXONE HYDROCHLORIDE 0.4 MG/ML
80 INJECTION, SOLUTION INTRAMUSCULAR; INTRAVENOUS; SUBCUTANEOUS AS NEEDED
Status: DISCONTINUED | OUTPATIENT
Start: 2022-09-08 | End: 2022-09-08

## 2022-09-08 RX ORDER — HYDROCODONE BITARTRATE AND ACETAMINOPHEN 5; 325 MG/1; MG/1
1 TABLET ORAL EVERY 4 HOURS PRN
Qty: 40 TABLET | Refills: 0 | Status: SHIPPED | OUTPATIENT
Start: 2022-09-08 | End: 2022-09-15

## 2022-09-08 RX ORDER — ONDANSETRON 2 MG/ML
4 INJECTION INTRAMUSCULAR; INTRAVENOUS EVERY 6 HOURS PRN
Status: DISCONTINUED | OUTPATIENT
Start: 2022-09-08 | End: 2022-09-08

## 2022-09-08 RX ADMIN — SODIUM CHLORIDE, SODIUM LACTATE, POTASSIUM CHLORIDE, CALCIUM CHLORIDE: 600; 310; 30; 20 INJECTION, SOLUTION INTRAVENOUS at 11:47:00

## 2022-09-08 RX ADMIN — SODIUM CHLORIDE, SODIUM LACTATE, POTASSIUM CHLORIDE, CALCIUM CHLORIDE: 600; 310; 30; 20 INJECTION, SOLUTION INTRAVENOUS at 11:44:00

## 2022-09-08 RX ADMIN — MIDAZOLAM HYDROCHLORIDE 2 MG: 1 INJECTION INTRAMUSCULAR; INTRAVENOUS at 12:07:00

## 2022-09-08 RX ADMIN — CEFAZOLIN SODIUM/WATER 2 G: 2 G/20 ML SYRINGE (ML) INTRAVENOUS at 12:00:00

## 2022-09-08 RX ADMIN — LIDOCAINE HYDROCHLORIDE 50 MG: 10 INJECTION, SOLUTION EPIDURAL; INFILTRATION; INTRACAUDAL; PERINEURAL at 11:55:00

## 2022-09-08 RX ADMIN — SODIUM CHLORIDE, SODIUM LACTATE, POTASSIUM CHLORIDE, CALCIUM CHLORIDE: 600; 310; 30; 20 INJECTION, SOLUTION INTRAVENOUS at 13:31:00

## 2022-09-08 NOTE — ANESTHESIA POSTPROCEDURE EVALUATION
Patient: Kylee Vasquez    Procedure Summary     Date: 09/08/22 Room / Location: [facility] OR  / [facility] OR    Anesthesia Start: 1147 Anesthesia Stop: 3572    Procedures:       Right Curtis bunionectomy, right second and third toe arthroplasty (Right Foot)      TOE HAMMER CORRECTION (Right Toe) Diagnosis:       Bunion, right      Hammer toe of right foot      Corns and callosities      Right foot pain      (Bunion, right [M21.611]Hammer toe of right foot [M20.41]Corns and callosities [L84]Right foot pain [M79.671])    Surgeons: Noemí Russell DPM Anesthesiologist: Kalyan Fisher MD    Anesthesia Type: MAC ASA Status: 2          Anesthesia Type: MAC    Vitals Value Taken Time   /78 09/08/22 1340   Temp 97.2 09/08/22 1340   Pulse 68 09/08/22 1340   Resp 16 09/08/22 1340   SpO2 99 09/08/22 1340       [facility] AN Post Evaluation:   Patient Evaluated in PACU  Patient Participation: complete - patient participated  Level of Consciousness: awake  Pain Management: adequate  Airway Patency:patent  Dental exam unchanged from preop  Yes    Cardiovascular Status: acceptable  Respiratory Status: acceptable  Postoperative Hydration acceptable      LUCY Yo CRNA  9/8/2022 1:40 PM

## 2022-09-08 NOTE — OPERATIVE REPORT
OPERATIVE REPORT     Brett Ferris Patient Status:  Hospital Outpatient Surgery    1965 MRN [id number]   [place] [address] Attending Fela Dumont DPM   Hosp Day # 0 PCP Mike Armenta DO     Date of Surgery: 22    Preoperative Diagnosis: Bunion, right [M21.611]  Hammer toe of right foot [M20.41]  Corns and callosities [L84]  Right foot pain [M79.671]    Postoperative Diagnosis: same    Primary Surgeon: Brennen Courtney DPM    Assistant: none    Procedures:   1. Right foot Curtis bunionectomy  2. Right 2nd toe arthroplasty  3. Right 3rd toe arthroplasty    Anesthesia: MAC    Complications: none    Implants: 1 3.0mm headed niraj mini screw    Specimen: none    Drains: none    Condition: stable    Estimated Blood Loss: 10ml    Preoperative history/indications:  Patient presented to Brennen Courtney DPM due to chronic right bunion and hammertoe pain. All preoperative conservative care have failed to resolve the patient's pain and discomfort. The patient would like to proceed with surgery. Informed Consent: All treatment options have been discussed with the pt of both conservative and surgical attempt at correction including the potential risks and complications of surgical intervention including but not exhaustive of death, loss of limb, post op pain, swelling, infection, bleeding, reoccurrence of the deformity, rotation, elevation, malposition, extended healing, non-union or delayed union and the possibility of further and future surgery. No guarantees have been made to the pt and the informed consent has been signed. Procedure in detail:  The pt was brought into the operating room and placed on the operating table in the supine position. A pneumatic tourniquet was placed about the patient's right ankle.   Following IV sedation, local anesthesia was obtained about the right foot utilizing 20 cc's of a 1:1 mixture of 1% lidocaine plain and 0.5% marcaine plain. The foot was then scrubbed, prepped, and draped in the usual aseptic manner. The pneumatic tourniquet was inflated. Attn was directed to the dorsal aspect of the 1st MPJ right foot where a 6cm linear longitudinal incision was made medial and parallel to the tendon of the EHL and involved the contour of the deformity. The incision was deepened through the subcutaneous tissues using sharp and blunt dissection. Care was taken to identify and retract and vital neural and vascular structures. All venous contributaries were cauterized (or ligated) as necessary. At this time a capsulotomy was performed over the dorsal aspect of the 1st MPJ. The periosteal and capsular structures were then carefully dissected free of their osseous attachments and reflected medially and laterally thus exposing the head of the first metatarsal at the operative site. Next, the dorsal and medial prominences were resected and passed from the operative site. All rough edges were then smoothed with the bone rasp (rotary egg gian, reciprocating rasp). Attn was then directed to the 1st interspace via the original skin incision. The dissection was continued deep using sharp/blunt dissection. The conjoined tendon of the adductor halluces muscle was then identified and transected at its attachment to the base of the prox phalanx of the hallux. At this time the lateral contracture present on the hallux was noted to be reduced. Attn was then redirected to the medial aspect of the 1st metatarsal head where a through and through V type osteotomy was created in the metaphysical region of this bone utilizing a sagittal bone saw. Upon completion of the osteotomy the capital fragment was distracted and shifted laterally into a more corrected position and impacted upon the 1st met shaft. The guide wire for the screw was then driven from dorsal to plantar across the osteotomy site.   Following standard AO principles and techniques, a 3.0mm headed niraj mini screw was inserted across the osteotomy site with excellent compression noted. The remaining guide wire was then removed. Attn was then directed to the remaining medial bone shelf which was resected utilizing the sagittal bone saw. Correction of the deformity was assessed at this time and noted to be excellent. The wound was then flushed with copious amounts of sterile normal saline. The periosteal and capsular structures were reapproximated and coapted utilizing vicryl. Redundant capsular tissue was resected as necessary. The subcuticular tissues were then reapproximated and coatped utilizing 4-0 vicryl, and the skin was reapproximated and coapted utilizing 4-0 nylon. Attention was directed to the 2nd and 3rd digit right foot where linear longitudinal incisions were made over the dorsal aspect of the PIPJ and encompassed a dorsal callus. The incisions were deepened through the subcutaneous tissues, with care being taken to identify and retract all vital neural and vascular structures. At this time, a transverse tenotomy and capsulotomy was performed to the PIPJ of the 2nd and 3rd digit right foot. The head of the proximal phalanx was then freed of its capsular and ligamentous attachments. Next utilizing the oscillating bone saw, the head of the proximal phalanx was resected and passed from the operative site. The wound was then flushed with sterile normal saline. At this time a 0.045 inch k-wire was driven through the proximal aspect of the base of the middle phalanx exiting the distal aspect of the 2nd and 3rd digit. The k-wire was then retrograded proximally through the remaining aspect of the proximal phalanx 2 and 3. The wounds were then Flushed with Sterile Normal Saline. The Extensor Tendons Were Then Reapproximated Coapted Utilizing Vicryl and the Skin Was Then Reapproximated Utilizing Nylon.   At This Point in Time Intraoperative Fluoroscopy Was Obtained Which Did Reveal Adequate Reduction of the Previous Hallux Valgus Deformity with a Rectus Nature of the Second and Third Digits Noted As Well. All hardware was noted to be in good placement. Upon completion of the procedure, a postoperative block consisting of 20 cc's of 0.5% Marcaine plain was injected along the incision site. The incision was dressed with xeroform and covered with sterile compressive dressings consisting of gauze, mini, kerlix, and webril. The pneumatic ankle tourniquet was then deflated and a prompt hyperemic response was noted to all digits of the right foot. An ace wrap was then applied. The pt tolerated the procedure and anesthesia well.  she was transferred to the recovery room with VSS and vascular status intact. Following a period of postoperative monitoring, the pt will be discharged home on the following written and oral postop instructions: keep dressings CDI, NWB right LE using crutches, ice and elevate foot when at rest, wear surgical shoe at all times when ambulating, contact my office for all postoperative f/u care and should any problems arise.       Laly Little DPM   9/8/22

## 2022-09-09 ENCOUNTER — TELEPHONE (OUTPATIENT)
Dept: PODIATRY CLINIC | Facility: CLINIC | Age: 57
End: 2022-09-09

## 2022-09-09 NOTE — TELEPHONE ENCOUNTER
Pt is asking leave date and return date for FMLA papework was given to office 2 weeks ago please advise

## 2022-09-12 ENCOUNTER — HOSPITAL ENCOUNTER (OUTPATIENT)
Dept: GENERAL RADIOLOGY | Age: 57
Discharge: HOME | End: 2022-09-12
Attending: PODIATRIST
Payer: MEDICARE

## 2022-09-12 ENCOUNTER — OFFICE VISIT (OUTPATIENT)
Dept: PODIATRY CLINIC | Facility: CLINIC | Age: 57
End: 2022-09-12
Payer: MEDICARE

## 2022-09-12 DIAGNOSIS — Z98.890 S/P FOOT SURGERY, RIGHT: ICD-10-CM

## 2022-09-12 DIAGNOSIS — R60.0 EDEMA OF RIGHT FOOT: ICD-10-CM

## 2022-09-12 DIAGNOSIS — M79.671 RIGHT FOOT PAIN: ICD-10-CM

## 2022-09-12 DIAGNOSIS — Z98.890 S/P FOOT SURGERY, RIGHT: Primary | ICD-10-CM

## 2022-09-12 PROCEDURE — 73630 X-RAY EXAM OF FOOT: CPT | Performed by: PODIATRIST

## 2022-09-12 PROCEDURE — 99024 POSTOP FOLLOW-UP VISIT: CPT | Performed by: PODIATRIST

## 2022-09-12 NOTE — TELEPHONE ENCOUNTER
Patient is wanting a call with status on forms. Patient states needs forms to be completed as soon as possible. Please advise.

## 2022-09-14 NOTE — TELEPHONE ENCOUNTER
Pt called stating her forms need to indicate an exact rtw date. Roberto Gutiérrez is not accepting a date range.

## 2022-09-21 ENCOUNTER — HOSPITAL ENCOUNTER (OUTPATIENT)
Dept: GENERAL RADIOLOGY | Age: 57
Discharge: HOME | End: 2022-09-21
Attending: PODIATRIST

## 2022-09-21 ENCOUNTER — OFFICE VISIT (OUTPATIENT)
Dept: PODIATRY CLINIC | Facility: CLINIC | Age: 57
End: 2022-09-21

## 2022-09-21 DIAGNOSIS — R60.0 EDEMA OF RIGHT FOOT: ICD-10-CM

## 2022-09-21 DIAGNOSIS — Z98.890 S/P FOOT SURGERY, RIGHT: ICD-10-CM

## 2022-09-21 DIAGNOSIS — Z98.890 S/P FOOT SURGERY, RIGHT: Primary | ICD-10-CM

## 2022-09-21 PROCEDURE — L3260 AMBULATORY SURGICAL BOOT EAC: HCPCS | Performed by: PODIATRIST

## 2022-09-21 PROCEDURE — 99024 POSTOP FOLLOW-UP VISIT: CPT | Performed by: PODIATRIST

## 2022-09-21 PROCEDURE — 73630 X-RAY EXAM OF FOOT: CPT | Performed by: PODIATRIST

## 2022-09-27 ENCOUNTER — TELEPHONE (OUTPATIENT)
Dept: FAMILY MEDICINE CLINIC | Facility: CLINIC | Age: 57
End: 2022-09-27

## 2022-09-27 NOTE — TELEPHONE ENCOUNTER
Patient is requesting referral.     [facility]  Reason for visit with the specialist: pins removed  Address of the specialist office:[place]     Appointment date: 9/28/22         [facility] informed patient the turnaround time for referral is 5-7 business days. Patient was informed to check their [employer] account for referral status.

## 2022-09-28 ENCOUNTER — TELEPHONE (OUTPATIENT)
Dept: FAMILY MEDICINE CLINIC | Facility: CLINIC | Age: 57
End: 2022-09-28

## 2022-09-28 ENCOUNTER — TELEPHONE (OUTPATIENT)
Dept: CASE MANAGEMENT | Age: 57
End: 2022-09-28

## 2022-09-28 ENCOUNTER — OFFICE VISIT (OUTPATIENT)
Dept: PODIATRY CLINIC | Facility: CLINIC | Age: 57
End: 2022-09-28

## 2022-09-28 DIAGNOSIS — Z98.890 S/P FOOT SURGERY, RIGHT: Primary | ICD-10-CM

## 2022-09-28 DIAGNOSIS — Z98.890 S/P FOOT SURGERY, RIGHT: ICD-10-CM

## 2022-09-28 DIAGNOSIS — R60.0 EDEMA OF RIGHT FOOT: ICD-10-CM

## 2022-09-28 DIAGNOSIS — R60.0 EDEMA OF RIGHT FOOT: Primary | ICD-10-CM

## 2022-09-28 PROCEDURE — 99024 POSTOP FOLLOW-UP VISIT: CPT | Performed by: PODIATRIST

## 2022-09-28 NOTE — TELEPHONE ENCOUNTER
Patient came into the clinic and is requesting that the referral for the short cam boot be referred by Dr Nicholas Deng, since he is her PCP.      Please fax the referral to [phone]

## 2022-09-28 NOTE — TELEPHONE ENCOUNTER
Dr. Nadiya Tinoco,    Patient requesting a referral for office visit 9/28/22 for follow up visit. Pended referral please review diagnosis and sign off if you agree. Thank you.   Devaughn Montalvo

## 2022-09-28 NOTE — TELEPHONE ENCOUNTER
Patient is requesting referral.     Name of specialist and specialty department : Dr. Ojeda, Podiatry  Reason for visit with the specialist: Maurice Hart visit  Address of the specialist office: [facility]  Appointment date: 9/28 1:15         [facility] informed patient the turnaround time for referral is 5-7 business days. Patient was informed to check their [employer] account for referral status.

## 2022-09-29 ENCOUNTER — TELEPHONE (OUTPATIENT)
Dept: PODIATRY CLINIC | Facility: CLINIC | Age: 57
End: 2022-09-29

## 2022-09-29 NOTE — TELEPHONE ENCOUNTER
Patient states per doctor she was to come to appointment 10/03 with a boot but the place she is getting boot from is closed. Patient would like to know if she should keep appointment or rescheduled.  Please advise

## 2022-09-29 NOTE — TELEPHONE ENCOUNTER
Patient is requesting referral to be faxed to another [facility] in [facility]. Patient states [facility] location is all booked.      [id number] Fax: [phone]

## 2022-09-30 NOTE — TELEPHONE ENCOUNTER
Spoke with patient advised her to keep appt at this time and ask Dr. Lauren Esparza what to do about boot supply issues.

## 2022-09-30 NOTE — TELEPHONE ENCOUNTER
Pt states all DME are closed due to something with inventory states she can come Tuesday asking what should she do until then please advise

## 2022-10-03 ENCOUNTER — OFFICE VISIT (OUTPATIENT)
Dept: PODIATRY CLINIC | Facility: CLINIC | Age: 57
End: 2022-10-03
Payer: MEDICARE

## 2022-10-03 ENCOUNTER — HOSPITAL ENCOUNTER (OUTPATIENT)
Dept: GENERAL RADIOLOGY | Age: 57
Discharge: HOME | End: 2022-10-03
Attending: PODIATRIST
Payer: MEDICARE

## 2022-10-03 DIAGNOSIS — Z98.890 S/P FOOT SURGERY, RIGHT: Primary | ICD-10-CM

## 2022-10-03 DIAGNOSIS — R60.0 EDEMA OF RIGHT FOOT: ICD-10-CM

## 2022-10-03 DIAGNOSIS — Z98.890 S/P FOOT SURGERY, RIGHT: ICD-10-CM

## 2022-10-03 PROCEDURE — 99024 POSTOP FOLLOW-UP VISIT: CPT | Performed by: PODIATRIST

## 2022-10-03 PROCEDURE — 73630 X-RAY EXAM OF FOOT: CPT | Performed by: PODIATRIST

## 2022-10-24 ENCOUNTER — OFFICE VISIT (OUTPATIENT)
Dept: PODIATRY CLINIC | Facility: CLINIC | Age: 57
End: 2022-10-24
Payer: MEDICARE

## 2022-10-24 DIAGNOSIS — R60.0 EDEMA OF RIGHT FOOT: ICD-10-CM

## 2022-10-24 DIAGNOSIS — Z98.890 S/P FOOT SURGERY, RIGHT: Primary | ICD-10-CM

## 2022-10-24 PROCEDURE — 99024 POSTOP FOLLOW-UP VISIT: CPT | Performed by: PODIATRIST

## 2022-11-08 ENCOUNTER — HOSPITAL ENCOUNTER (EMERGENCY)
Facility: HOSPITAL | Age: 57
Discharge: HOME | End: 2022-11-08
Attending: EMERGENCY MEDICINE
Payer: MEDICARE

## 2022-11-08 ENCOUNTER — APPOINTMENT (OUTPATIENT)
Dept: GENERAL RADIOLOGY | Facility: HOSPITAL | Age: 57
End: 2022-11-08
Payer: MEDICARE

## 2022-11-08 VITALS
HEART RATE: 59 BPM | RESPIRATION RATE: 17 BRPM | TEMPERATURE: 98 F | DIASTOLIC BLOOD PRESSURE: 67 MMHG | SYSTOLIC BLOOD PRESSURE: 115 MMHG | WEIGHT: 128 LBS | BODY MASS INDEX: 24 KG/M2 | OXYGEN SATURATION: 99 %

## 2022-11-08 DIAGNOSIS — M79.671 RIGHT FOOT PAIN: Primary | ICD-10-CM

## 2022-11-08 PROCEDURE — 99283 EMERGENCY DEPT VISIT LOW MDM: CPT

## 2022-11-08 PROCEDURE — 96372 THER/PROPH/DIAG INJ SC/IM: CPT

## 2022-11-08 PROCEDURE — 73630 X-RAY EXAM OF FOOT: CPT | Performed by: EMERGENCY MEDICINE

## 2022-11-08 RX ORDER — KETOROLAC TROMETHAMINE 30 MG/ML
30 INJECTION, SOLUTION INTRAMUSCULAR; INTRAVENOUS ONCE
Status: COMPLETED | OUTPATIENT
Start: 2022-11-08 | End: 2022-11-08

## 2022-11-08 RX ORDER — KETOROLAC TROMETHAMINE 10 MG/1
10 TABLET, FILM COATED ORAL EVERY 6 HOURS PRN
Qty: 20 TABLET | Refills: 0 | Status: SHIPPED | OUTPATIENT
Start: 2022-11-08 | End: 2022-11-13

## 2022-11-08 NOTE — ED INITIAL ASSESSMENT (HPI)
AOx4. Complaints of right foot swelling. Reports bunion and spur surgery in September; wearing boot and elevating foot but swelling persists. Advised crutches were no longer needed.

## 2022-11-14 ENCOUNTER — OFFICE VISIT (OUTPATIENT)
Dept: PODIATRY CLINIC | Facility: CLINIC | Age: 57
End: 2022-11-14
Payer: MEDICARE

## 2022-11-14 ENCOUNTER — HOSPITAL ENCOUNTER (OUTPATIENT)
Dept: GENERAL RADIOLOGY | Age: 57
Discharge: HOME | End: 2022-11-14
Attending: PODIATRIST
Payer: MEDICARE

## 2022-11-14 DIAGNOSIS — M79.671 RIGHT FOOT PAIN: ICD-10-CM

## 2022-11-14 DIAGNOSIS — Z98.890 S/P FOOT SURGERY, RIGHT: Primary | ICD-10-CM

## 2022-11-14 DIAGNOSIS — R60.0 EDEMA OF RIGHT FOOT: ICD-10-CM

## 2022-11-14 DIAGNOSIS — Z98.890 S/P FOOT SURGERY, RIGHT: ICD-10-CM

## 2022-11-14 PROCEDURE — 99024 POSTOP FOLLOW-UP VISIT: CPT | Performed by: PODIATRIST

## 2022-11-14 PROCEDURE — 73630 X-RAY EXAM OF FOOT: CPT | Performed by: PODIATRIST

## 2022-12-01 NOTE — TELEPHONE ENCOUNTER
Spoke with pt. She will most likely be dropping her forms off in ado location. Pt tried fxing her forms to us but we have not received them. Explained our policy and turn around time to pt. She expressed understanding.

## 2022-12-02 ENCOUNTER — OFFICE VISIT (OUTPATIENT)
Dept: PODIATRY CLINIC | Facility: CLINIC | Age: 57
End: 2022-12-02
Payer: MEDICARE

## 2022-12-02 DIAGNOSIS — Z98.890 S/P FOOT SURGERY, RIGHT: Primary | ICD-10-CM

## 2022-12-02 PROCEDURE — 99024 POSTOP FOLLOW-UP VISIT: CPT | Performed by: PODIATRIST

## 2022-12-02 NOTE — TELEPHONE ENCOUNTER
Dr. Tavares Hameed,      Please sign off on form: RTW   -Highlight the patient and hit \"Chart\" button. -In Chart Review, w/in the Encounter tab - click 1 time on the Telephone call encounter for 8/26/22 Scroll down the telephone encounter.  -Click \"scan on\" blue Hyperlink under \"Media\" heading for RTW Dr. Tavares Hameed 12/2/22 w/in the telephone enc.  -Click on Acknowledge button at the bottom right corner and left-click onto image, signature stamp appears and drag signature to Provider signature line. Stamp will turn blue. Close window.      Thank you,  [facility]

## 2023-02-06 ENCOUNTER — OFFICE VISIT (OUTPATIENT)
Dept: FAMILY MEDICINE CLINIC | Facility: CLINIC | Age: 58
End: 2023-02-06

## 2023-02-06 VITALS
WEIGHT: 128.38 LBS | TEMPERATURE: 98 F | HEART RATE: 73 BPM | DIASTOLIC BLOOD PRESSURE: 67 MMHG | HEIGHT: 61 IN | BODY MASS INDEX: 24.24 KG/M2 | SYSTOLIC BLOOD PRESSURE: 111 MMHG

## 2023-02-06 DIAGNOSIS — M62.838 TRAPEZIUS MUSCLE SPASM: Primary | ICD-10-CM

## 2023-02-06 DIAGNOSIS — Z12.31 VISIT FOR SCREENING MAMMOGRAM: ICD-10-CM

## 2023-02-06 DIAGNOSIS — Z87.891 PERSONAL HISTORY OF TOBACCO USE, PRESENTING HAZARDS TO HEALTH: ICD-10-CM

## 2023-02-06 DIAGNOSIS — K44.9 HIATAL HERNIA WITH GERD: ICD-10-CM

## 2023-02-06 DIAGNOSIS — M79.671 RIGHT FOOT PAIN: ICD-10-CM

## 2023-02-06 DIAGNOSIS — K21.9 HIATAL HERNIA WITH GERD: ICD-10-CM

## 2023-02-06 DIAGNOSIS — K21.9 GASTROESOPHAGEAL REFLUX DISEASE, UNSPECIFIED WHETHER ESOPHAGITIS PRESENT: ICD-10-CM

## 2023-02-06 PROCEDURE — 99214 OFFICE O/P EST MOD 30 MIN: CPT | Performed by: FAMILY MEDICINE

## 2023-02-06 RX ORDER — PANTOPRAZOLE SODIUM 40 MG/1
40 TABLET, DELAYED RELEASE ORAL
Qty: 90 TABLET | Refills: 1 | Status: SHIPPED | OUTPATIENT
Start: 2023-02-06 | End: 2024-02-06

## 2023-02-06 RX ORDER — CYCLOBENZAPRINE HCL 10 MG
10 TABLET ORAL NIGHTLY
Qty: 30 TABLET | Refills: 0 | Status: SHIPPED | OUTPATIENT
Start: 2023-02-06 | End: 2023-03-08

## 2023-02-06 RX ORDER — ALBUTEROL SULFATE 90 UG/1
2 AEROSOL, METERED RESPIRATORY (INHALATION) EVERY 6 HOURS PRN
Qty: 1 EACH | Refills: 2 | Status: SHIPPED | OUTPATIENT
Start: 2023-02-06

## 2023-02-16 ENCOUNTER — TELEPHONE (OUTPATIENT)
Dept: PHYSICAL THERAPY | Facility: HOSPITAL | Age: 58
End: 2023-02-16

## 2023-02-20 ENCOUNTER — OFFICE VISIT (OUTPATIENT)
Dept: PHYSICAL THERAPY | Age: 58
End: 2023-02-20
Attending: FAMILY MEDICINE
Payer: MEDICARE

## 2023-02-20 DIAGNOSIS — M62.838 TRAPEZIUS MUSCLE SPASM: ICD-10-CM

## 2023-02-20 PROCEDURE — 97161 PT EVAL LOW COMPLEX 20 MIN: CPT

## 2023-02-20 PROCEDURE — 97110 THERAPEUTIC EXERCISES: CPT

## 2023-02-22 ENCOUNTER — OFFICE VISIT (OUTPATIENT)
Dept: PHYSICAL THERAPY | Age: 58
End: 2023-02-22
Attending: FAMILY MEDICINE
Payer: MEDICARE

## 2023-02-22 PROCEDURE — 97140 MANUAL THERAPY 1/> REGIONS: CPT

## 2023-02-22 PROCEDURE — 97110 THERAPEUTIC EXERCISES: CPT

## 2023-02-22 NOTE — PROGRESS NOTES
Diagnosis:   Trapezius muscle spasm (R51.631)    Next MD visit: none scheduled  Fall Risk: standard         Precautions: n/a          Medication Changes since last visit?: No    Subjective: Patient reports 3/10 neck pain with movements (R>L)  She reports she used some heat on her neck which helped yesterday. Objective:     Date: 2/22/2023  Visit #: 2/12 O (exp. 5/20/2023)   HEP   -    Therapeutic Exercise   - seated c-retraction 10x  - seated c-extension with towel 10x  - supine R/L c-rotation 10x ea  - supine B shoulder flexion 1# 1 x 10  - supine B shoulder horizontal abd/add 1# 1 x 10  - supine c-retraction 10x 5 sec holds  - repeat supine c-retraction 10x 5 sec holds  - sidelying R/L upper trunk rotation 10x ea   Manual Therapy   - STM R/L cervical paraspinals x 6 minutes in sitting  - supine c-distraction with R/L c-rotation/c-lateral flexion x 12 minutes   Therapeutic Activity   -   Modalities   - heat pad at start of session in sitting x 5 minutes             Assessment: Session focused on gentle cervical ROM and thoracic ROM exercises. Initiated manual techniques to assist with reducing muscle tension.        Plan: continue PT    Charges: Ex 2 Man 1  Total Timed Treatment: 42 min  Total Treatment Time: 47 min

## 2023-02-27 ENCOUNTER — OFFICE VISIT (OUTPATIENT)
Dept: PHYSICAL THERAPY | Age: 58
End: 2023-02-27
Attending: FAMILY MEDICINE
Payer: MEDICARE

## 2023-02-27 PROCEDURE — 97140 MANUAL THERAPY 1/> REGIONS: CPT

## 2023-02-27 PROCEDURE — 97110 THERAPEUTIC EXERCISES: CPT

## 2023-02-27 NOTE — PROGRESS NOTES
Diagnosis:   Trapezius muscle spasm (K49.489)    Next MD visit: none scheduled  Fall Risk: standard         Precautions: n/a          Medication Changes since last visit?: No    Subjective: Patient reports 0/10 neck pain today, but reports stiffness. Objective:     Date: 2/27/2023  Visit #: 3/12 HMO (exp. 5/20/2023) Date: 2/22/2023  Visit #: 2/12 HMO (exp. 5/20/2023)   HEP    -    Therapeutic Exercise   - seated R/L c-lateral flexion 10x ea  - seated c-retraction 2 x 10  - seated c-extension with towel 10x  - supine R/L c-rotation 10x ea  - supine B shoulder flexion 1# 1 x 15   - supine B shoulder horizontal abd/add 1# 1 x 15  - standing B scaption 1# 2 x 10  - standing B high scapular rows with GSC   2 x 10  - standing B shoulder extension with [address] 2 x 10 - seated c-retraction 10x  - seated c-extension with towel 10x  - supine R/L c-rotation 10x ea  - supine B shoulder flexion 1# 1 x 10  - supine B shoulder horizontal abd/add 1# 1 x 10  - supine c-retraction 10x 5 sec holds  - repeat supine c-retraction 10x 5 sec holds  - sidelying R/L upper trunk rotation 10x ea   Manual Therapy   - supine c-distraction with R/L c-rotation/c-lateral flexion x 10 minutes - STM R/L cervical paraspinals x 6 minutes in sitting  - supine c-distraction with R/L c-rotation/c-lateral flexion x 12 minutes   Therapeutic Activity    -   Modalities    - heat pad at start of session in sitting x 5 minutes              Assessment:  Initiated scapular stabilization strengthening exercises today.       Plan: continue PT    Charges: Ex 2 Man 1  Total Timed Treatment: 45 min  Total Treatment Time: 45 min

## 2023-03-01 ENCOUNTER — APPOINTMENT (OUTPATIENT)
Dept: PHYSICAL THERAPY | Age: 58
End: 2023-03-01
Attending: FAMILY MEDICINE
Payer: MEDICARE

## 2023-03-06 ENCOUNTER — APPOINTMENT (OUTPATIENT)
Dept: PHYSICAL THERAPY | Age: 58
End: 2023-03-06
Attending: FAMILY MEDICINE
Payer: MEDICARE

## 2023-03-06 ENCOUNTER — OFFICE VISIT (OUTPATIENT)
Dept: PHYSICAL THERAPY | Age: 58
End: 2023-03-06
Attending: FAMILY MEDICINE
Payer: MEDICARE

## 2023-03-06 PROCEDURE — 97140 MANUAL THERAPY 1/> REGIONS: CPT

## 2023-03-06 PROCEDURE — 97110 THERAPEUTIC EXERCISES: CPT

## 2023-03-06 NOTE — PROGRESS NOTES
Diagnosis:   Trapezius muscle spasm (W87.478)    Next MD visit: none scheduled  Fall Risk: standard         Precautions: n/a          Medication Changes since last visit?: No    Subjective: Patient reports her neck is doing okay. She reports she is wondering if her pillows and sleeping will affect her neck pain. She reports her neck is feeling less stiff overall. Did not report pain today.     Objective:    3/6/2023:  Cervical ROM:  Rot: R WNL, L min loss  Lat flx: R WNL, L min loss/WNL     Date: 3/6/2023  Visit #: 4/12 HMO (exp. 5/20/2023) Date: 2/27/2023  Visit #: 3/12 HMO (exp. 5/20/2023) Date: 2/22/2023  Visit #: 2/12 HMO (exp. 5/20/2023)   HEP     -    Therapeutic Exercise   - seated c-retraction 1 x 10  - seated c-extension with towel 2 x 10  - standing snow angels 1 x 10  - standing B low trap with [address] \"A\" 2 x 10  - standing B scap rows with GSC 2 x 10  - standing B scaption 1# 2 x 10  - supine c-retraction 2 x 10  - supine B shoulder horizontal abd/add 1# 2 x 10  - sidelying R/L upper trunk 1 x 10 ea  - reassessment - seated R/L c-lateral flexion 10x ea  - seated c-retraction 2 x 10  - seated c-extension with towel 10x  - supine R/L c-rotation 10x ea  - supine B shoulder flexion 1# 1 x 15   - supine B shoulder horizontal abd/add 1# 1 x 15  - standing B scaption 1# 2 x 10  - standing B high scapular rows with GSC   2 x 10  - standing B shoulder extension with RSC 2 x 10 - seated c-retraction 10x  - seated c-extension with towel 10x  - supine R/L c-rotation 10x ea  - supine B shoulder flexion 1# 1 x 10  - supine B shoulder horizontal abd/add 1# 1 x 10  - supine c-retraction 10x 5 sec holds  - repeat supine c-retraction 10x 5 sec holds  - sidelying R/L upper trunk rotation 10x ea   Manual Therapy   - supine c-distraction with R/L c-rotation/c-lateral flexion x 10 minutes - supine c-distraction with R/L c-rotation/c-lateral flexion x 10 minutes - STM R/L cervical paraspinals x 6 minutes in sitting  - supine c-distraction with R/L c-rotation/c-lateral flexion x 12 minutes   Therapeutic Activity     -   Modalities   - heat pad at start of session in sitting x 5 minutes  - heat pad at start of session in sitting x 5 minutes             Assessment:  Patient displaying improved cervical ROM today with reassessment. Initiated lower trap strengthening to assist with improving postural muscles to reduce stress on cervical spine.      Plan: continue PT    Charges: Ex 2 Man 1  Total Timed Treatment: 45 min  Total Treatment Time: 50 min

## 2023-03-08 ENCOUNTER — APPOINTMENT (OUTPATIENT)
Dept: PHYSICAL THERAPY | Age: 58
End: 2023-03-08
Attending: FAMILY MEDICINE
Payer: MEDICARE

## 2023-03-13 ENCOUNTER — HOSPITAL ENCOUNTER (OUTPATIENT)
Dept: MAMMOGRAPHY | Age: 58
Discharge: HOME | End: 2023-03-13
Attending: FAMILY MEDICINE
Payer: MEDICARE

## 2023-03-13 ENCOUNTER — OFFICE VISIT (OUTPATIENT)
Dept: PHYSICAL THERAPY | Age: 58
End: 2023-03-13
Attending: FAMILY MEDICINE
Payer: MEDICARE

## 2023-03-13 DIAGNOSIS — Z12.31 VISIT FOR SCREENING MAMMOGRAM: ICD-10-CM

## 2023-03-13 PROCEDURE — 97110 THERAPEUTIC EXERCISES: CPT

## 2023-03-13 PROCEDURE — 97140 MANUAL THERAPY 1/> REGIONS: CPT

## 2023-03-13 PROCEDURE — 77063 BREAST TOMOSYNTHESIS BI: CPT | Performed by: FAMILY MEDICINE

## 2023-03-13 PROCEDURE — 77067 SCR MAMMO BI INCL CAD: CPT | Performed by: FAMILY MEDICINE

## 2023-03-13 NOTE — PROGRESS NOTES
Diagnosis:   Trapezius muscle spasm (A97.219)    Next MD visit: none scheduled  Fall Risk: standard         Precautions: n/a          Medication Changes since last visit?: No    Subjective: Pt reports 7/10 B upper trap region pain today. She reports this began Saturday. 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      Objective:    3/6/2023:  Cervical ROM:  Rot: R WNL, L min loss  Lat flx: R WNL, L min loss/WNL     Date: 3/13/2023  Visit #: 5/12 HMO (exp. 5/20/2023) Date: 3/6/2023  Visit #: 4/12 HMO (exp. 5/20/2023) Date: 2/27/2023  Visit #: 3/12 HMO (exp. 5/20/2023)   HEP        Therapeutic Exercise   - seated c-retraction 3 x 10   - standing R/L scap rows with 15# pulley 1 x 10 ea  - standing B shoulder extension with GSC 2 x 10  - standing B bicep curls 5# 2 x 10  - supine B shoulder horizontal abd/add with 3# 2 x 10  - supine B chest press 5# 2 x 10 - seated c-retraction 1 x 10  - seated c-extension with towel 2 x 10  - standing snow angels 1 x 10  - standing B low trap with [address] \"A\" 2 x 10  - standing B scap rows with GSC 2 x 10  - standing B scaption 1# 2 x 10  - supine c-retraction 2 x 10  - supine B shoulder horizontal abd/add 1# 2 x 10  - sidelying R/L upper trunk 1 x 10 ea  - reassessment - seated R/L c-lateral flexion 10x ea  - seated c-retraction 2 x 10  - seated c-extension with towel 10x  - supine R/L c-rotation 10x ea  - supine B shoulder flexion 1# 1 x 15   - supine B shoulder horizontal abd/add 1# 1 x 15  - standing B scaption 1# 2 x 10  - standing B high scapular rows with GSC   2 x 10  - standing B shoulder extension with RSC 2 x 10   Manual Therapy   - supine c-distraction with R/L c-rotation/c-lateral flexion x 10 minutes  - STM R/L upper trap musculature x 10 minutes - supine c-distraction with R/L c-rotation/c-lateral flexion x 10 minutes - supine c-distraction with R/L c-rotation/c-lateral flexion x 10 minutes   Therapeutic Activity        Modalities    - heat pad at start of session in sitting x 5 minutes              Assessment: Initiated manual techniques due to exacerbation of pain to assist with reducing muscle tension. Continued with cervical and thoracic stretching with scapular strengthening.      Plan: continue PT    Charges: Ex 2 Man 1  Total Timed Treatment: 45 min  Total Treatment Time: 50 min

## 2023-03-15 ENCOUNTER — APPOINTMENT (OUTPATIENT)
Dept: PHYSICAL THERAPY | Age: 58
End: 2023-03-15
Attending: FAMILY MEDICINE
Payer: MEDICARE

## 2023-03-16 ENCOUNTER — TELEPHONE (OUTPATIENT)
Dept: PHYSICAL THERAPY | Facility: HOSPITAL | Age: 58
End: 2023-03-16

## 2023-03-20 ENCOUNTER — APPOINTMENT (OUTPATIENT)
Dept: PHYSICAL THERAPY | Age: 58
End: 2023-03-20
Attending: FAMILY MEDICINE
Payer: MEDICARE

## 2023-03-28 ENCOUNTER — HOSPITAL ENCOUNTER (OUTPATIENT)
Dept: ULTRASOUND IMAGING | Facility: HOSPITAL | Age: 58
Discharge: HOME | End: 2023-03-28
Attending: FAMILY MEDICINE
Payer: MEDICARE

## 2023-03-28 ENCOUNTER — HOSPITAL ENCOUNTER (OUTPATIENT)
Dept: MAMMOGRAPHY | Facility: HOSPITAL | Age: 58
Discharge: HOME | End: 2023-03-28
Attending: FAMILY MEDICINE
Payer: MEDICARE

## 2023-03-28 DIAGNOSIS — R92.8 ABNORMAL MAMMOGRAM: ICD-10-CM

## 2023-03-28 PROCEDURE — 77061 BREAST TOMOSYNTHESIS UNI: CPT | Performed by: FAMILY MEDICINE

## 2023-03-28 PROCEDURE — 77065 DX MAMMO INCL CAD UNI: CPT | Performed by: FAMILY MEDICINE

## 2023-03-28 PROCEDURE — 76642 ULTRASOUND BREAST LIMITED: CPT | Performed by: FAMILY MEDICINE

## 2023-06-05 ENCOUNTER — APPOINTMENT (OUTPATIENT)
Dept: GENERAL RADIOLOGY | Facility: HOSPITAL | Age: 58
End: 2023-06-05
Payer: MEDICARE

## 2023-06-05 ENCOUNTER — HOSPITAL ENCOUNTER (EMERGENCY)
Facility: HOSPITAL | Age: 58
Discharge: HOME | End: 2023-06-05
Attending: EMERGENCY MEDICINE
Payer: MEDICARE

## 2023-06-05 VITALS
SYSTOLIC BLOOD PRESSURE: 139 MMHG | HEART RATE: 82 BPM | DIASTOLIC BLOOD PRESSURE: 77 MMHG | RESPIRATION RATE: 18 BRPM | BODY MASS INDEX: 23.41 KG/M2 | HEIGHT: 61 IN | OXYGEN SATURATION: 96 % | TEMPERATURE: 97 F | WEIGHT: 124 LBS

## 2023-06-05 DIAGNOSIS — S92.902A CLOSED FRACTURE OF LEFT FOOT, INITIAL ENCOUNTER: Primary | ICD-10-CM

## 2023-06-05 PROCEDURE — 29515 APPLICATION SHORT LEG SPLINT: CPT

## 2023-06-05 PROCEDURE — 73630 X-RAY EXAM OF FOOT: CPT

## 2023-06-05 PROCEDURE — 99284 EMERGENCY DEPT VISIT MOD MDM: CPT

## 2023-06-05 RX ORDER — HYDROCODONE BITARTRATE AND ACETAMINOPHEN 5; 325 MG/1; MG/1
1 TABLET ORAL EVERY 6 HOURS PRN
Qty: 10 TABLET | Refills: 0 | Status: SHIPPED | OUTPATIENT
Start: 2023-06-05

## 2023-06-05 NOTE — ED INITIAL ASSESSMENT (HPI)
Pt reports left foot pain and swelling after stepping down off the bottom step of a ladder and hitting her foot on something metal. Pt reports falling once her foot hit the object denies hitting her head. CMS intact, pedal pulse strong, ice applied in triage.

## 2023-06-06 ENCOUNTER — TELEPHONE (OUTPATIENT)
Dept: PODIATRY CLINIC | Facility: CLINIC | Age: 58
End: 2023-06-06

## 2023-06-06 NOTE — TELEPHONE ENCOUNTER
S/w Dr Ojeda and sent him the xray impression. He states that we can add on for Monday 6/12/23 at 9:30. Patient accepted appointment.

## 2023-06-06 NOTE — TELEPHONE ENCOUNTER
Pt calling for a sooner appt for the following reason closed fractured left foot seen in ER please advise

## 2023-06-12 ENCOUNTER — OFFICE VISIT (OUTPATIENT)
Dept: PODIATRY CLINIC | Facility: CLINIC | Age: 58
End: 2023-06-12

## 2023-06-12 DIAGNOSIS — R60.0 EDEMA OF LEFT FOOT: ICD-10-CM

## 2023-06-12 DIAGNOSIS — M79.672 LEFT FOOT PAIN: ICD-10-CM

## 2023-06-12 DIAGNOSIS — S92.352A DISPLACED FRACTURE OF FIFTH METATARSAL BONE, LEFT FOOT, INITIAL ENCOUNTER FOR CLOSED FRACTURE: Primary | ICD-10-CM

## 2023-07-07 ENCOUNTER — TELEPHONE (OUTPATIENT)
Dept: PODIATRY CLINIC | Facility: CLINIC | Age: 58
End: 2023-07-07

## 2023-07-14 ENCOUNTER — OFFICE VISIT (OUTPATIENT)
Dept: PODIATRY CLINIC | Facility: CLINIC | Age: 58
End: 2023-07-14

## 2023-07-14 ENCOUNTER — NURSE TRIAGE (OUTPATIENT)
Dept: FAMILY MEDICINE CLINIC | Facility: CLINIC | Age: 58
End: 2023-07-14

## 2023-07-14 ENCOUNTER — HOSPITAL ENCOUNTER (OUTPATIENT)
Dept: GENERAL RADIOLOGY | Facility: HOSPITAL | Age: 58
Discharge: HOME | End: 2023-07-14
Attending: PODIATRIST
Payer: MEDICARE

## 2023-07-14 DIAGNOSIS — K21.9 HIATAL HERNIA WITH GERD: ICD-10-CM

## 2023-07-14 DIAGNOSIS — M79.672 LEFT FOOT PAIN: ICD-10-CM

## 2023-07-14 DIAGNOSIS — S92.352D DISPLACED FRACTURE OF FIFTH METATARSAL BONE, LEFT FOOT, SUBSEQUENT ENCOUNTER FOR FRACTURE WITH ROUTINE HEALING: Primary | ICD-10-CM

## 2023-07-14 DIAGNOSIS — K44.9 HIATAL HERNIA WITH GERD: ICD-10-CM

## 2023-07-14 DIAGNOSIS — Z47.89 ORTHOPEDIC AFTERCARE: ICD-10-CM

## 2023-07-14 DIAGNOSIS — K21.9 GASTROESOPHAGEAL REFLUX DISEASE, UNSPECIFIED WHETHER ESOPHAGITIS PRESENT: ICD-10-CM

## 2023-07-14 PROCEDURE — 99213 OFFICE O/P EST LOW 20 MIN: CPT | Performed by: PODIATRIST

## 2023-07-14 PROCEDURE — 73630 X-RAY EXAM OF FOOT: CPT | Performed by: PODIATRIST

## 2023-07-14 RX ORDER — PANTOPRAZOLE SODIUM 40 MG/1
40 TABLET, DELAYED RELEASE ORAL
Qty: 90 TABLET | Refills: 1 | Status: SHIPPED | OUTPATIENT
Start: 2023-07-14 | End: 2024-07-13

## 2023-07-14 NOTE — PROGRESS NOTES
[address] [facility] Podiatry  Progress Note    Torin Flores is a 59year old female. Patient presents with: Follow - Up: Left foot fracture, denies pain at the moment        HPI:     This is a pleasant female with COPD and PMH of tobacco abuse, but is no longer smoking. She presents to clinic today due to left foot fracture f/u. She states on  she twisted her left foot. She is not wearing the cam boot as directed but notes improvement. Allergies: Patient has no known allergies. Current Outpatient Medications   Medication Sig Dispense Refill    albuterol 108 (90 Base) MCG/ACT Inhalation Aero Soln Inhale 2 puffs into the lungs every 6 (six) hours as needed for Wheezing. 1 each 2    pantoprazole 40 MG Oral Tab EC Take 1 tablet (40 mg total) by mouth every morning before breakfast. To help with stomach acid and stomach irritation/inflammation 90 tablet 1    nicotine polacrilex 2 MG Mouth/Throat Gum Take 1 each (2 mg total) by mouth as needed for Smoking cessation. 40 each 2    HYDROcodone-acetaminophen 5-325 MG Oral Tab Take 1 tablet by mouth every 6 (six) hours as needed for Pain.  (Patient not taking: Reported on 2023) 10 tablet 0      Past Medical History:   Diagnosis Date    Anxiety state     Arthritis     Asthma     Bronchitis     Cervical spine arthritis     Congestive heart disease ([address]     Dementia ([address]     Pt denies (22)    Depression     Esophageal reflux     History of blood transfusion     Pneumonia due to organism     Visual impairment     Glasses      Past Surgical History:   Procedure Laterality Date      1998    CHOLECYSTECTOMY      ELECTROCARDIOGRAM, COMPLETE  2012    Scanned to Media Tab - Date of Service 2012          x3    REMOVAL GALLBLADDER        Family History   Problem Relation Age of Onset    Heart Disease Mother     Cataracts Mother     Macular degeneration Mother     Dementia Mother     Other (Osteoarthritis) Mother Osteoarthritis    Stroke Father         per [employer]:  \"CVA (Stroke), (cause of death)\"    Cataracts Father     Glaucoma Father     Other (Myocardial infarction) Father         per [employer]    Cancer Sister         Cancer, pancreatic    Pancreatic Cancer Sister 48    Heart Attack Sister     No Known Problems Sister     No Known Problems Sister     No Known Problems Sister     Other (Other) Brother     No Known Problems Brother     No Known Problems Brother     No Known Problems Brother     No Known Problems Daughter     Diabetes Maternal Aunt       Social History    Socioeconomic History      Marital status: Single    Tobacco Use      Smoking status: Former        Years: 10.00        Types: Cigarettes      Smokeless tobacco: Never      Tobacco comments: 3-4 cigarettes/a day    Vaping Use      Vaping Use: Never used    Substance and Sexual Activity      Alcohol use: Yes        Comment: WEEKENDS 1-2 cans      Drug use: Yes        Types: Cannabis        Comment: Recreationally      Sexual activity: Yes        Partners: Male    Other Topics      Concerns:        Caffeine Concern: Yes          Soda, 3 cans a day        Right Handed: Yes          REVIEW OF SYSTEMS:   Denies nausea, fever, chills  No calf pain  Right foot pain  Denies chest pain or shortness of breath. EXAM:   There were no vitals taken for this visit. Constitutional:   Patient in no apparent distress. Well kept Of normal body habitus. Alert and oriented to person, place, and time. Integumentary examination:   There are no varicosities. Skin appears moist, warm, and supple with positive hair growth. Vascular examination:   DP pulse is 2/4  PT pulse is 2/4  Capillary refill is immediate    Neurological examination:   Vibratory (128-Hz tuning fork) sensation is present to right and is present to left. Sharp/dull is present to right and is present to left.   Musculoskeletal examination:  Left pedal muscle strength slightly diminished due to pain and guarding, improved    POP to left 5th metatarsal, improved       LABS & IMAGING:     Lab Results   Component Value Date     (H) 03/11/2022    BUN 16 03/11/2022    CREATSERUM 0.74 03/11/2022    BUNCREA 21.6 (H) 03/11/2022    ANIONGAP 5 03/11/2022    GFRAA 103 03/11/2022    GFRNAA 90 03/11/2022    CA 8.8 03/11/2022     03/11/2022    K 3.8 03/11/2022     03/11/2022    CO2 28.0 03/11/2022    OSMOCALC 297 (H) 03/11/2022        No results found for: EAG, A1C     No results found. ASSESSMENT AND PLAN:   Diagnoses and all orders for this visit:    Displaced fracture of fifth metatarsal bone, left foot, subsequent encounter for fracture with routine healing    Left foot pain    Other orders  -     XR FOOT WEIGHTBEARING (3 VIEWS), LEFT (CPT=73630); Future        Plan:       Evaluated the patient and discussed treatment options. Reviewed the patients x-rays with the patient. Fracture care and treatment plan discussed. Discussed the importance of immobilization. Discussed conservative management   Discussed surgical management and indications for both. Will move forward with conservative  management. Recommend cryotherapy/icing, rest, and elevation. Xray Left foot: 7/14/23  CONCLUSION: Mildly displaced fracture distal shaft of the 5th metatarsal with increased healing. Due to only mild displacement will cont to treat conservatively at this time. Discussed with pt the importance of fracture care compliance. If she continues to not wear the cam boot she has a higher chance of developing a non union and increased displacement which could require surgery in the future. Pt to be WBAT in the short cam boot at all times when ambulating  Pt to limit ambulation and to ice and elevate when resting    RTC 4 weeks will get new xrays. If healed will transition to regular tennis shoes. Will also discuss removal of right foot screw. No follow-ups on file.     Sarah Krueger, DPM  7/14/23

## 2023-07-18 ENCOUNTER — OFFICE VISIT (OUTPATIENT)
Dept: FAMILY MEDICINE CLINIC | Facility: CLINIC | Age: 58
End: 2023-07-18

## 2023-07-18 VITALS
HEART RATE: 72 BPM | TEMPERATURE: 98 F | WEIGHT: 125 LBS | SYSTOLIC BLOOD PRESSURE: 138 MMHG | BODY MASS INDEX: 23.6 KG/M2 | HEIGHT: 61 IN | DIASTOLIC BLOOD PRESSURE: 72 MMHG

## 2023-07-18 DIAGNOSIS — K21.9 GASTROESOPHAGEAL REFLUX DISEASE, UNSPECIFIED WHETHER ESOPHAGITIS PRESENT: Primary | ICD-10-CM

## 2023-07-18 DIAGNOSIS — K21.9 HIATAL HERNIA WITH GERD: ICD-10-CM

## 2023-07-18 DIAGNOSIS — M65.321 TRIGGER INDEX FINGER OF RIGHT HAND: ICD-10-CM

## 2023-07-18 DIAGNOSIS — K44.9 HIATAL HERNIA WITH GERD: ICD-10-CM

## 2023-07-18 DIAGNOSIS — M65.341 TRIGGER RING FINGER OF RIGHT HAND: ICD-10-CM

## 2023-07-18 PROCEDURE — 99214 OFFICE O/P EST MOD 30 MIN: CPT | Performed by: FAMILY MEDICINE

## 2023-07-18 RX ORDER — PANTOPRAZOLE SODIUM 40 MG/1
40 TABLET, DELAYED RELEASE ORAL
Qty: 90 TABLET | Refills: 1 | Status: SHIPPED | OUTPATIENT
Start: 2023-07-18 | End: 2024-07-17

## 2023-07-24 NOTE — TELEPHONE ENCOUNTER
Dr Vicky Kwon,    Pt is seeking accomodation at work due to Displaced fracture of fifth metatarsal bone, left foot. Pt stts she has been off since 06/05/23 and will return pending her re-eval appt with you 08/11/23. Do you support pt being off?       Thanks,    Cristobal Vidal

## 2023-07-26 NOTE — TELEPHONE ENCOUNTER
Dr. Feliz Bryan     Please sign off on form if you agree to: ADA due to fifth metatarsal bone, left foot. Start date 06/05/23-08/11/23 pending re-eval.  (Please place your signature on the first page only)    -From your Inbasket, Highlight the patient and click Chart   -Double click the 05/95/[id number] Forms Completion telephone encounter  -Scroll down to the Media section   -Click the blue Hyperlink: ADA Dr Feliz Bryan [birth year]  -Click Acknowledge located at the bottom right corner (if you do not see acknowledge, try maximizing your window)   -Drag the mouse into the blank space of the document and a + sign will appear. Left click to   electronically sign the document.      Thank you,      Rudy Acevedo

## 2023-07-27 NOTE — TELEPHONE ENCOUNTER
Disab forms faxed to [facility] [phone]. Confirmation received, [employer] message sent.

## 2023-08-11 ENCOUNTER — HOSPITAL ENCOUNTER (OUTPATIENT)
Dept: GENERAL RADIOLOGY | Facility: HOSPITAL | Age: 58
Discharge: HOME | End: 2023-08-11
Attending: PODIATRIST
Payer: MEDICARE

## 2023-08-11 ENCOUNTER — OFFICE VISIT (OUTPATIENT)
Dept: PODIATRY CLINIC | Facility: CLINIC | Age: 58
End: 2023-08-11

## 2023-08-11 DIAGNOSIS — S92.352D DISPLACED FRACTURE OF FIFTH METATARSAL BONE, LEFT FOOT, SUBSEQUENT ENCOUNTER FOR FRACTURE WITH ROUTINE HEALING: ICD-10-CM

## 2023-08-11 DIAGNOSIS — S92.352D DISPLACED FRACTURE OF FIFTH METATARSAL BONE, LEFT FOOT, SUBSEQUENT ENCOUNTER FOR FRACTURE WITH ROUTINE HEALING: Primary | ICD-10-CM

## 2023-08-11 PROCEDURE — 73630 X-RAY EXAM OF FOOT: CPT | Performed by: PODIATRIST

## 2023-08-11 PROCEDURE — 99213 OFFICE O/P EST LOW 20 MIN: CPT | Performed by: PODIATRIST

## 2023-08-11 NOTE — PROGRESS NOTES
[facility], [facility] Podiatry  Progress Note    Krzysztof Casper is a 59year old female. No chief complaint on file. HPI:     This is a pleasant female with COPD and PMH of tobacco abuse, but is no longer smoking. She presents to clinic today due to left foot fracture f/u. She states on  she twisted her left foot. She wearing the cam boot and denies any significant pain. Allergies: Patient has no known allergies. Current Outpatient Medications   Medication Sig Dispense Refill    pantoprazole 40 MG Oral Tab EC Take 1 tablet (40 mg total) by mouth every morning before breakfast. To help with stomach acid and stomach irritation/inflammation 90 tablet 1    HYDROcodone-acetaminophen 5-325 MG Oral Tab Take 1 tablet by mouth every 6 (six) hours as needed for Pain. (Patient not taking: Reported on 2023) 10 tablet 0    albuterol 108 (90 Base) MCG/ACT Inhalation Aero Soln Inhale 2 puffs into the lungs every 6 (six) hours as needed for Wheezing. 1 each 2    nicotine polacrilex 2 MG Mouth/Throat Gum Take 1 each (2 mg total) by mouth as needed for Smoking cessation.  36 each 2      Past Medical History:   Diagnosis Date    Anxiety state     Arthritis     Asthma     Bronchitis     Cervical spine arthritis     Congestive heart disease ([address]     Dementia [address] [address] [facility]     Pt denies (22)    Depression     Esophageal reflux     History of blood transfusion     Pneumonia due to organism     Visual impairment     Glasses      Past Surgical History:   Procedure Laterality Date      1998    CHOLECYSTECTOMY  2006    ELECTROCARDIOGRAM, COMPLETE  2012    Scanned to Media Tab - Date of Service 2012          x3    REMOVAL GALLBLADDER        Family History   Problem Relation Age of Onset    Heart Disease Mother     Cataracts Mother     Macular degeneration Mother     Dementia Mother     Other (Osteoarthritis) Mother         Osteoarthritis    Stroke Father         per [employer]:  \"CVA (Stroke), (cause of death)\"    Cataracts Father     Glaucoma Father     Other (Myocardial infarction) Father         per [employer]    Cancer Sister         Cancer, pancreatic    Pancreatic Cancer Sister 48    Heart Attack Sister     No Known Problems Sister     No Known Problems Sister     No Known Problems Sister     Other (Other) Brother     No Known Problems Brother     No Known Problems Brother     No Known Problems Brother     No Known Problems Daughter     Diabetes Maternal Aunt       Social History    Socioeconomic History      Marital status: Single    Tobacco Use      Smoking status: Former        Years: 10.00        Types: Cigarettes      Smokeless tobacco: Never      Tobacco comments: 3-4 cigarettes/a day    Vaping Use      Vaping Use: Never used    Substance and Sexual Activity      Alcohol use: Yes        Comment: WEEKENDS 1-2 cans      Drug use: Yes        Types: Cannabis        Comment: Recreationally      Sexual activity: Yes        Partners: Male    Other Topics      Concerns:        Caffeine Concern: Yes          Soda, 3 cans a day        Right Handed: Yes          REVIEW OF SYSTEMS:   Denies nausea, fever, chills  No calf pain  Right foot pain  Denies chest pain or shortness of breath. EXAM:   There were no vitals taken for this visit. Constitutional:   Patient in no apparent distress. Well kept Of normal body habitus. Alert and oriented to person, place, and time. Integumentary examination:   There are no varicosities. Skin appears moist, warm, and supple with positive hair growth. Vascular examination:   DP pulse is 2/4  PT pulse is 2/4  Capillary refill is immediate    Neurological examination:   Vibratory (128-Hz tuning fork) sensation is present to right and is present to left. Sharp/dull is present to right and is present to left.   Musculoskeletal examination:  Left pedal muscle strength 5/5    POP to left 5th metatarsal, resolved      LABS & IMAGING:     Lab Results Component Value Date     (H) 03/11/2022    BUN 16 03/11/2022    CREATSERUM 0.74 03/11/2022    BUNCREA 21.6 (H) 03/11/2022    ANIONGAP 5 03/11/2022    GFRAA 103 03/11/2022    GFRNAA 90 03/11/2022    CA 8.8 03/11/2022     03/11/2022    K 3.8 03/11/2022     03/11/2022    CO2 28.0 03/11/2022    OSMOCALC 297 (H) 03/11/2022        No results found for: EAG, A1C     No results found. ASSESSMENT AND PLAN:   Diagnoses and all orders for this visit:    Displaced fracture of fifth metatarsal bone, left foot, subsequent encounter for fracture with routine healing          Plan:       Evaluated the patient and discussed treatment options. Reviewed the patients x-rays with the patient. Fracture care and treatment plan discussed. Discussed the importance of immobilization. Discussed conservative management   Discussed surgical management and indications for both. Will move forward with conservative  management. Recommend cryotherapy/icing, rest, and elevation. Xray Left foot: 8/11/23  CONCLUSION: Mildly displaced fracture distal shaft of the 5th metatarsal with almost complete osseous consolidation. Pt may transition to supportive tennis shoes, but to still limit ambulation and to hold off on any high impact exercises for another month. She may return to work part time for 1 month    RTC in October to discuss removal of right foot screw from previous bunion surgery. Will get new xrays for surgical consultation. No follow-ups on file.     Alma Rosa Green DPM  8/11/23

## 2023-09-18 ENCOUNTER — OFFICE VISIT (OUTPATIENT)
Dept: SURGERY | Facility: CLINIC | Age: 58
End: 2023-09-18

## 2023-09-18 DIAGNOSIS — M65.351 TRIGGER LITTLE FINGER OF RIGHT HAND: ICD-10-CM

## 2023-09-18 DIAGNOSIS — M65.341 TRIGGER RING FINGER OF RIGHT HAND: ICD-10-CM

## 2023-09-18 DIAGNOSIS — M65.331 TRIGGER MIDDLE FINGER OF RIGHT HAND: Primary | ICD-10-CM

## 2023-09-18 RX ORDER — BETAMETHASONE SODIUM PHOSPHATE AND BETAMETHASONE ACETATE 3; 3 MG/ML; MG/ML
18 INJECTION, SUSPENSION INTRA-ARTICULAR; INTRALESIONAL; INTRAMUSCULAR; SOFT TISSUE ONCE
Status: COMPLETED | OUTPATIENT
Start: 2023-09-18 | End: 2023-09-18

## 2023-10-12 ENCOUNTER — NURSE TRIAGE (OUTPATIENT)
Dept: FAMILY MEDICINE CLINIC | Facility: CLINIC | Age: 58
End: 2023-10-12

## 2023-10-12 NOTE — TELEPHONE ENCOUNTER
Left message to call back regarding Dr. Mehnaz Farfan note. MyChart sent. I did not give her an appointment in our previous call. I told her that I would ask Dr. Samuel Burroughs if we could use his RES 24 on 10/23/23 and she was agreeable with that date because she was off. I advised her that we would call her back with his recommendation. RN please advise Dr. Mehnaz Farfan note.

## 2023-10-12 NOTE — TELEPHONE ENCOUNTER
Action Requested: Summary for Provider     []  Critical Lab, Recommendations Needed  [x] Need Additional Advice  []   FYI    []   Need Orders  [] Need Medications Sent to Pharmacy  []  Other     SUMMARY: Per protocol: OV. Offered first available appointments with other providers. Patient only wants to see Dr. Petar Montoya. First available with Dr. Petar Montoya is RES 24 on 10/23/23 at 2:30 pm. Okay to book? Reason for call: Nasal Congestion  Onset: Data Unavailable      Patient states that she has a cough and congestion. Symptoms started about a week ago. She states she gets this way when the seasons change. She denies shortness of breath, fever or sinus pain.      Reason for Disposition   Sinus congestion (pressure, fullness) present > 10 days    Protocols used: Sinus Pain or Congestion-A-OH

## 2023-10-13 NOTE — TELEPHONE ENCOUNTER
Future Appointments   Date Time Provider Radha Karen   10/16/2023 10:30 AM Juan Antonio Chong DPM [facility]   10/16/2023 11:30 AM Yogi Dover DO [facility]      Suzan Sales  Em [facility]8 minutes ago (10:25 AM)     MD  Talked to patient appointment made.

## 2023-10-13 NOTE — TELEPHONE ENCOUNTER
Patient indicated that she checked when she got home and unfortunately she already has an appointment with Dr Lauren Esparza on 10/16/2023 at 10:30am and that would be cutting it too close. Changed appointment to 10/16/2023 at 6pm res24 slot with Dr Nadiya Tinoco in [facility] instead.   Future Appointments   Date Time Provider Radha Jones   10/16/2023 10:30 AM Wiley Reyes DPM [facility]   10/16/2023  6:00 PM Yogi Dover DO [facility]

## 2023-10-13 NOTE — TELEPHONE ENCOUNTER
Patient cannot come in on Saturday since working, but can come in on Monday. Ok to use Res24 at 10am on Monday 10/16/2023?

## 2023-10-13 NOTE — TELEPHONE ENCOUNTER
Left message to pt to call back. H#, also attempted to call pt's daughter Nelly West) no answer. Pt did not view [employer] message.      Delivery Read From Message Type Attachments Subject   10/12/2023  5:13 PM Darrell Talbert RN Patient Medical Advice Request  Appointment with Dr. Axel Pena   Date Time Provider Radha Jones   10/16/2023 10:30 AM [facility]

## 2023-10-16 ENCOUNTER — OFFICE VISIT (OUTPATIENT)
Dept: PODIATRY CLINIC | Facility: CLINIC | Age: 58
End: 2023-10-16

## 2023-10-16 ENCOUNTER — HOSPITAL ENCOUNTER (OUTPATIENT)
Dept: GENERAL RADIOLOGY | Age: 58
Discharge: HOME | End: 2023-10-16
Attending: FAMILY MEDICINE
Payer: MEDICARE

## 2023-10-16 ENCOUNTER — HOSPITAL ENCOUNTER (OUTPATIENT)
Dept: GENERAL RADIOLOGY | Facility: HOSPITAL | Age: 58
Discharge: HOME | End: 2023-10-16
Attending: PODIATRIST
Payer: MEDICARE

## 2023-10-16 ENCOUNTER — TELEPHONE (OUTPATIENT)
Dept: PODIATRY CLINIC | Facility: CLINIC | Age: 58
End: 2023-10-16

## 2023-10-16 ENCOUNTER — OFFICE VISIT (OUTPATIENT)
Dept: FAMILY MEDICINE CLINIC | Facility: CLINIC | Age: 58
End: 2023-10-16

## 2023-10-16 VITALS
HEART RATE: 71 BPM | TEMPERATURE: 98 F | DIASTOLIC BLOOD PRESSURE: 80 MMHG | SYSTOLIC BLOOD PRESSURE: 135 MMHG | HEIGHT: 61 IN | WEIGHT: 122 LBS | BODY MASS INDEX: 23.03 KG/M2

## 2023-10-16 DIAGNOSIS — K44.9 HIATAL HERNIA WITH GERD: ICD-10-CM

## 2023-10-16 DIAGNOSIS — K21.9 GASTROESOPHAGEAL REFLUX DISEASE, UNSPECIFIED WHETHER ESOPHAGITIS PRESENT: ICD-10-CM

## 2023-10-16 DIAGNOSIS — K21.9 HIATAL HERNIA WITH GERD: ICD-10-CM

## 2023-10-16 DIAGNOSIS — T84.84XA PAINFUL ORTHOPAEDIC HARDWARE (HCC): Primary | ICD-10-CM

## 2023-10-16 DIAGNOSIS — R09.81 NASAL CONGESTION: ICD-10-CM

## 2023-10-16 DIAGNOSIS — R05.1 ACUTE COUGH: Primary | ICD-10-CM

## 2023-10-16 DIAGNOSIS — R05.1 ACUTE COUGH: ICD-10-CM

## 2023-10-16 DIAGNOSIS — T84.84XA PAINFUL ORTHOPAEDIC HARDWARE (HCC): ICD-10-CM

## 2023-10-16 DIAGNOSIS — M79.671 RIGHT FOOT PAIN: ICD-10-CM

## 2023-10-16 PROCEDURE — 99214 OFFICE O/P EST MOD 30 MIN: CPT | Performed by: PODIATRIST

## 2023-10-16 PROCEDURE — 73630 X-RAY EXAM OF FOOT: CPT | Performed by: PODIATRIST

## 2023-10-16 PROCEDURE — 71046 X-RAY EXAM CHEST 2 VIEWS: CPT | Performed by: FAMILY MEDICINE

## 2023-10-16 PROCEDURE — 99214 OFFICE O/P EST MOD 30 MIN: CPT | Performed by: FAMILY MEDICINE

## 2023-10-16 RX ORDER — AMOXICILLIN 875 MG/1
875 TABLET, COATED ORAL 2 TIMES DAILY
Qty: 20 TABLET | Refills: 0 | Status: SHIPPED | OUTPATIENT
Start: 2023-10-16 | End: 2023-10-26

## 2023-10-16 NOTE — PROGRESS NOTES
[facility], [facility] Podiatry  Progress Note    Kayla Ayoub is a 59year old female. Patient presents with: Foot Pain: R foot f/u- here to discuss sx for removal of screw fr bunion sx - rates pain 5-6/10 only when touched  Fracture: L foot f/u- rates pain 4/10 on and off        HPI:     This is a pleasant female with COPD and PMH of tobacco abuse. She presents to clinic today due to right foot painful screw. She does have PMH of christos bunionectomy and states the screw head is prominent and painful. She would like to proceed with removal of the screw. Allergies: Patient has no known allergies. Current Outpatient Medications   Medication Sig Dispense Refill    pantoprazole 40 MG Oral Tab EC Take 1 tablet (40 mg total) by mouth every morning before breakfast. To help with stomach acid and stomach irritation/inflammation 90 tablet 1    HYDROcodone-acetaminophen 5-325 MG Oral Tab Take 1 tablet by mouth every 6 (six) hours as needed for Pain. (Patient not taking: Reported on 2023) 10 tablet 0    albuterol 108 (90 Base) MCG/ACT Inhalation Aero Soln Inhale 2 puffs into the lungs every 6 (six) hours as needed for Wheezing. 1 each 2    nicotine polacrilex 2 MG Mouth/Throat Gum Take 1 each (2 mg total) by mouth as needed for Smoking cessation.  36 each 2      Past Medical History:   Diagnosis Date    Anxiety state     Arthritis     Asthma     Bronchitis     Cervical spine arthritis     Congestive heart disease ([address]     Dementia ([address]     Pt denies (22)    Depression     Esophageal reflux     Fracture     R hand (unknown bone)    History of blood transfusion     Pneumonia due to organism     Visual impairment     Glasses      Past Surgical History:   Procedure Laterality Date    BUNIONECTOMY Right       1998    CHOLECYSTECTOMY      ELECTROCARDIOGRAM, COMPLETE  2012    Scanned to Media Tab - Date of Service 2012          x3    REMOVAL GALLBLADDER        Family History Problem Relation Age of Onset    Heart Disease Mother     Cataracts Mother     Macular degeneration Mother     Dementia Mother     Other (Osteoarthritis) Mother         Osteoarthritis    Stroke Father         per [employer]:  \"CVA (Stroke), (cause of death)\"    Cataracts Father     Glaucoma Father     Other (Myocardial infarction) Father         per [employer]    Cancer Sister         Cancer, pancreatic    Pancreatic Cancer Sister 48    Heart Attack Sister     No Known Problems Sister     No Known Problems Sister     No Known Problems Sister     Other (Other) Brother     No Known Problems Brother     No Known Problems Brother     No Known Problems Brother     No Known Problems Daughter     Diabetes Maternal Aunt       Social History    Socioeconomic History      Marital status: Single    Tobacco Use      Smoking status: Every Day        Years: 10        Types: Cigarettes      Smokeless tobacco: Never      Tobacco comments: 3-4 cigarettes/a day    Vaping Use      Vaping Use: Never used    Substance and Sexual Activity      Alcohol use: Yes        Comment: WEEKENDS 1-2 cans      Drug use: Yes        Types: Cannabis        Comment: Recreationally      Sexual activity: Yes        Partners: Male    Other Topics      Concerns:        Caffeine Concern: Yes          Soda, 3 cans a day        Right Handed: Yes          REVIEW OF SYSTEMS:   Denies nausea, fever, chills  No calf pain  Right foot pain  Denies chest pain or shortness of breath. EXAM:   There were no vitals taken for this visit. Constitutional:   Patient in no apparent distress. Well kept Of normal body habitus. Alert and oriented to person, place, and time. Integumentary examination:   There are no varicosities. Skin appears moist, warm, and supple with positive hair growth.      Vascular examination:   DP pulse is 2/4  PT pulse is 2/4  Capillary refill is immediate    Neurological examination:   Vibratory (128-Hz tuning fork) sensation is present to right and is present to left. Sharp/dull is present to right and is present to left. Musculoskeletal examination:  Pedal muscle strength 5/5    POP to prominent screw head Right dorsal distal 1st metatarsal      LABS & IMAGING:     Lab Results   Component Value Date     (H) 03/11/2022    BUN 16 03/11/2022    CREATSERUM 0.74 03/11/2022    BUNCREA 21.6 (H) 03/11/2022    ANIONGAP 5 03/11/2022    GFRAA 103 03/11/2022    GFRNAA 90 03/11/2022    CA 8.8 03/11/2022     03/11/2022    K 3.8 03/11/2022     03/11/2022    CO2 28.0 03/11/2022    OSMOCALC 297 (H) 03/11/2022        No results found for: \"EAG\", \"A1C\"     No results found. ASSESSMENT AND PLAN:   Diagnoses and all orders for this visit:    Painful orthopaedic hardware ([address]  -     XR FOOT WEIGHTBEARING (3 VIEWS), RIGHT   (CPT=73630); Future    Right foot pain            Plan:       Evaluated the patient and discussed treatment options. Reviewed the patients x-rays with the patient. Discussed conservative management   Discussed surgical management and indications for both. Pt would like to proceed with surgical removal of the screw right foot due to prominence and pain. Xray Right foot WB: 10/16/23: Healed christos bunionectomy with screw in good placement. Will proceed with removal of right foot painful screw    Pt will be WBAT in post op shoe for 1 week    The patient has been advised of the approximate disability involved for these procedures. In addition, the patient has been advised as to the alternatives of care, including continued conservative care as well as surgical procedures. The patient has failed all conservative treatment at this point.  The patient understands that if surgical procedures are performed, there are risks and complications that could occur, including but not limited to: hematoma formation, damage to the nervous system, seroma formation, development of a DVT or phlebitis, infection, painful scar tissue formation, stiffness, limited motion, delayed-union, non-union, mal-union, impaired healing, increased chance of swelling, swelling, reaction to implanted biomaterials, over-correction, under-correction with recurrence of the deformities, continued pain, loss of limb, loss of life, and the possibility that future surgery may need to be performed. The patient was given the opportunity to ask questions which were answered. The patient voiced no concerns and will consider all these options. Patient desires surgical intervention. No guarantees were given or implied. Pt consented freely to surgical intervention. I spent approximately 30 minutes discussing the surgical procedures at length. I reviewed in detail the procedure to be performed, postoperative course, disability to be expected, healing time, prognosis and the importance of their following the recommended postoperative care. Possible complications discussed included but not limited to recurrence of deformity, postoperative pain, swelling, stiffness, rejection of the implant if utilized, return to surgery, infection, residual pain, possible blood clot formation, bleeding, etc. Possible complications relating to over activity and limitations during the postoperative period were reviewed. The patient has responsibilities to help insure successful outcome of the surgery. Postoperative oral and written instructions were reviewed and postoperative course of treatment discussed in detail. Management of postoperative pain was discussed. All questions related to preoperative, operative and postoperative course of treatment were answered to their understanding and satisfaction. RTO and follow up after surgery is necessary and expected and agreed to by the patient. The patient acknowledged understanding of the above and agrees to follow all instructions and protocols. No guarantee or warranty was given or implied as to the results of the surgery.        No follow-ups on file.    Osman Mckeon, ADRIENNE  10/16/23

## 2023-10-16 NOTE — TELEPHONE ENCOUNTER
Procedure:   Removal of right foot painful orthopedic hardware  CPT code:   Length of Surgery: 30 minutes  Any Instruments: niraj mini screw  6.4HM  Call patient: within 24 hours  Anesthesia: MAC  Location: [facility]  Assistance: none  Pacemaker: No  Anticoagulants: No  Nickel Allergy: No  Latex Allergy: No  Diagnosis/ICD Code:   No diagnosis found.

## 2023-10-17 DIAGNOSIS — M79.671 RIGHT FOOT PAIN: ICD-10-CM

## 2023-10-17 DIAGNOSIS — T84.84XA PAINFUL ORTHOPAEDIC HARDWARE (HCC): Primary | ICD-10-CM

## 2023-10-17 NOTE — TELEPHONE ENCOUNTER
S/W patient and got surgery scheduled for 1/18 at [facility]. At this time, patient and I have gone over pre op instructions as well as booked post op appointments. Patient expressed verbal understanding of pre op instructions. Pre op instructions are also available for patient to view her [employer] account. Managed care orders have also been placed at this time. Patient was asked to reach me directly at [phone] should she have any questions or concerns prior to surgery.

## 2023-10-23 ENCOUNTER — HOSPITAL ENCOUNTER (OUTPATIENT)
Dept: GENERAL RADIOLOGY | Facility: HOSPITAL | Age: 58
Discharge: HOME | End: 2023-10-23
Attending: FAMILY MEDICINE

## 2023-10-23 DIAGNOSIS — K21.9 GASTROESOPHAGEAL REFLUX DISEASE, UNSPECIFIED WHETHER ESOPHAGITIS PRESENT: ICD-10-CM

## 2023-10-23 DIAGNOSIS — K44.9 HIATAL HERNIA WITH GERD: ICD-10-CM

## 2023-10-23 DIAGNOSIS — K21.9 HIATAL HERNIA WITH GERD: ICD-10-CM

## 2023-10-23 PROCEDURE — 74246 X-RAY XM UPR GI TRC 2CNTRST: CPT | Performed by: FAMILY MEDICINE

## 2023-11-06 ENCOUNTER — HOSPITAL ENCOUNTER (OUTPATIENT)
Age: 58
Discharge: HOME | End: 2023-11-06
Payer: MEDICARE

## 2023-11-06 VITALS
TEMPERATURE: 98 F | DIASTOLIC BLOOD PRESSURE: 70 MMHG | HEART RATE: 74 BPM | RESPIRATION RATE: 18 BRPM | SYSTOLIC BLOOD PRESSURE: 123 MMHG | OXYGEN SATURATION: 96 %

## 2023-11-06 DIAGNOSIS — B86 SCABIES: Primary | ICD-10-CM

## 2023-11-06 PROCEDURE — 99213 OFFICE O/P EST LOW 20 MIN: CPT

## 2023-11-06 RX ORDER — PERMETHRIN 50 MG/G
CREAM TOPICAL
Qty: 60 G | Refills: 0 | Status: SHIPPED | OUTPATIENT
Start: 2023-11-06

## 2023-11-06 NOTE — DISCHARGE INSTRUCTIONS
Rash is consistent with scabies. Please use the permethrin cream as directed. You will use it once now and repeat the process in 1 week. Please wash all of your bedding, clothing and linens in very hot water. If the rash persists despite this treatment you should follow-up with your primary care doctor or dermatologist as discussed. If you develop any chest pain, difficulty swallowing or any other concerning complaints you should go to the emergency department.

## 2023-11-26 ENCOUNTER — APPOINTMENT (OUTPATIENT)
Dept: CT IMAGING | Age: 58
End: 2023-11-26
Attending: NURSE PRACTITIONER
Payer: MEDICARE

## 2023-11-26 ENCOUNTER — HOSPITAL ENCOUNTER (OUTPATIENT)
Age: 58
Discharge: HOME | End: 2023-11-26
Payer: MEDICARE

## 2023-11-26 VITALS
HEART RATE: 65 BPM | DIASTOLIC BLOOD PRESSURE: 72 MMHG | OXYGEN SATURATION: 100 % | SYSTOLIC BLOOD PRESSURE: 120 MMHG | TEMPERATURE: 99 F | RESPIRATION RATE: 16 BRPM

## 2023-11-26 DIAGNOSIS — R10.32 ABDOMINAL PAIN, LEFT LOWER QUADRANT: Primary | ICD-10-CM

## 2023-11-26 DIAGNOSIS — R35.0 URINARY FREQUENCY: ICD-10-CM

## 2023-11-26 LAB
#MXD IC: 0.3 X10ˆ3/UL (ref 0.1–1)
BILIRUB UR QL STRIP: NEGATIVE
BUN BLD-MCNC: 9 MG/DL (ref 7–18)
CHLORIDE BLD-SCNC: 106 MMOL/L (ref 98–112)
CLARITY UR: CLEAR
CO2 BLD-SCNC: 24 MMOL/L (ref 21–32)
COLOR UR: YELLOW
CREAT BLD-MCNC: 0.6 MG/DL
EGFRCR SERPLBLD CKD-EPI 2021: 103 ML/MIN/1.73M2 (ref 60–?)
GLUCOSE BLD-MCNC: 88 MG/DL (ref 70–99)
GLUCOSE UR STRIP-MCNC: NEGATIVE MG/DL
HCT VFR BLD AUTO: 43 %
HCT VFR BLD CALC: 49 %
HGB BLD-MCNC: 14.3 G/DL
ISTAT IONIZED CALCIUM FOR CHEM 8: 1.17 MMOL/L (ref 1.12–1.32)
KETONES UR STRIP-MCNC: NEGATIVE MG/DL
LYMPHOCYTES # BLD AUTO: 1.5 X10ˆ3/UL (ref 1–4)
LYMPHOCYTES NFR BLD AUTO: 37.8 %
MCH RBC QN AUTO: 32.8 PG (ref 26–34)
MCHC RBC AUTO-ENTMCNC: 33.3 G/DL (ref 31–37)
MCV RBC AUTO: 98.6 FL (ref 80–100)
MIXED CELL %: 8.1 %
NEUTROPHILS # BLD AUTO: 2.1 X10ˆ3/UL (ref 1.5–7.7)
NEUTROPHILS NFR BLD AUTO: 54.1 %
NITRITE UR QL STRIP: NEGATIVE
PH UR STRIP: 5.5 [PH]
PLATELET # BLD AUTO: 235 X10ˆ3/UL (ref 150–450)
POTASSIUM BLD-SCNC: 3.7 MMOL/L (ref 3.6–5.1)
PROT UR STRIP-MCNC: NEGATIVE MG/DL
RBC # BLD AUTO: 4.36 X10ˆ6/UL
SODIUM BLD-SCNC: 141 MMOL/L (ref 136–145)
SP GR UR STRIP: >=1.03
UROBILINOGEN UR STRIP-ACNC: <2 MG/DL
WBC # BLD AUTO: 3.9 X10ˆ3/UL (ref 4–11)

## 2023-11-26 PROCEDURE — 80047 BASIC METABLC PNL IONIZED CA: CPT | Performed by: NURSE PRACTITIONER

## 2023-11-26 PROCEDURE — 85025 COMPLETE CBC W/AUTO DIFF WBC: CPT | Performed by: NURSE PRACTITIONER

## 2023-11-26 PROCEDURE — 74177 CT ABD & PELVIS W/CONTRAST: CPT | Performed by: NURSE PRACTITIONER

## 2023-11-26 PROCEDURE — 81002 URINALYSIS NONAUTO W/O SCOPE: CPT | Performed by: NURSE PRACTITIONER

## 2023-11-26 PROCEDURE — 99213 OFFICE O/P EST LOW 20 MIN: CPT | Performed by: NURSE PRACTITIONER

## 2023-11-26 RX ORDER — CEPHALEXIN 500 MG/1
500 CAPSULE ORAL 2 TIMES DAILY
Qty: 14 CAPSULE | Refills: 0 | Status: SHIPPED | OUTPATIENT
Start: 2023-11-26 | End: 2023-12-03

## 2023-11-26 NOTE — ED INITIAL ASSESSMENT (HPI)
Pt presents to IC for flank pain and concerned about bladder infection   Denies fever states has had pain x4 days comes and goes

## 2023-12-11 ENCOUNTER — TELEPHONE (OUTPATIENT)
Dept: PODIATRY CLINIC | Facility: CLINIC | Age: 58
End: 2023-12-11

## 2023-12-12 DIAGNOSIS — T84.84XA PAINFUL ORTHOPAEDIC HARDWARE (HCC): Primary | ICD-10-CM

## 2023-12-12 DIAGNOSIS — M79.671 RIGHT FOOT PAIN: ICD-10-CM

## 2024-01-17 ENCOUNTER — OFFICE VISIT (OUTPATIENT)
Facility: CLINIC | Age: 59
End: 2024-01-17

## 2024-01-17 ENCOUNTER — TELEPHONE (OUTPATIENT)
Facility: CLINIC | Age: 59
End: 2024-01-17

## 2024-01-17 VITALS
HEIGHT: 61 IN | WEIGHT: 121 LBS | HEART RATE: 74 BPM | DIASTOLIC BLOOD PRESSURE: 79 MMHG | SYSTOLIC BLOOD PRESSURE: 146 MMHG | BODY MASS INDEX: 22.84 KG/M2

## 2024-01-17 DIAGNOSIS — K21.9 GASTROESOPHAGEAL REFLUX DISEASE, UNSPECIFIED WHETHER ESOPHAGITIS PRESENT: Primary | ICD-10-CM

## 2024-01-17 DIAGNOSIS — K21.9 GASTROESOPHAGEAL REFLUX DISEASE, UNSPECIFIED WHETHER ESOPHAGITIS PRESENT: ICD-10-CM

## 2024-01-17 DIAGNOSIS — K44.9 HIATAL HERNIA: ICD-10-CM

## 2024-01-17 DIAGNOSIS — K44.9 HIATAL HERNIA: Primary | ICD-10-CM

## 2024-01-17 PROCEDURE — 99214 OFFICE O/P EST MOD 30 MIN: CPT | Performed by: INTERNAL MEDICINE

## 2024-01-17 RX ORDER — PANTOPRAZOLE SODIUM 40 MG/1
40 TABLET, DELAYED RELEASE ORAL
Qty: 30 TABLET | Refills: 2 | Status: SHIPPED | OUTPATIENT
Start: 2024-01-17

## 2024-01-17 NOTE — PROGRESS NOTES
Edith Mohamud is a 58 year old female.    HPI:     Chief Complaint   Patient presents with    Gastro-esophageal Reflux     The patient is a 58-year-old female who has a history of hiatal hernia, reflux who is seen today in the gastrology office for consultation for GERD.    Patient has a known hiatal hernia about 3 cm in size.  She does have chronic reflux.  Reports reflux has worsened recently.  She does continue to smoke about 3 cigarettes/day.  She does continue to eat fast food.  She denies any dysphagia.  She had been on antiacid therapy which had helped but she ran out of the prescription and stopped it and now her symptoms are back.    Patient had prior colonoscopy in , advise recall and .  She has no lower GI symptoms.      HISTORY:  Past Medical History:   Diagnosis Date    Anxiety state     Arthritis     Asthma     Bronchitis     Cervical spine arthritis     Congestive heart disease (HCC)     Dementia (HCC)     Pt denies (22)    Depression     Esophageal reflux     Foot fracture, left     Fracture     R hand (unknown bone)    History of blood transfusion     Pneumonia due to organism     Visual impairment     Glasses      Past Surgical History:   Procedure Laterality Date    BUNIONECTOMY Right       1998    CHOLECYSTECTOMY      ELECTROCARDIOGRAM, COMPLETE  2012    Scanned to Media Tab - Date of Service 2012    FOOT SURGERY Right     bunion removed          x3    REMOVAL GALLBLADDER        Family History   Problem Relation Age of Onset    Heart Disease Mother     Cataracts Mother     Macular degeneration Mother     Dementia Mother     Other (Osteoarthritis) Mother         Osteoarthritis    Stroke Father         per [employer]:  \"CVA (Stroke), (cause of death)\"    Cataracts Father     Glaucoma Father     Other (Myocardial infarction) Father         per [employer]    Cancer Sister         Cancer, pancreatic    Pancreatic Cancer Sister 50    Heart Attack Sister     No  Known Problems Sister     No Known Problems Sister     No Known Problems Sister     Other (Other) Brother     No Known Problems Brother     No Known Problems Brother     No Known Problems Brother     No Known Problems Daughter     Diabetes Maternal Aunt       Social History:   Social History     Socioeconomic History    Marital status: Single   Tobacco Use    Smoking status: Every Day     Years: 10     Types: Cigarettes    Smokeless tobacco: Never    Tobacco comments:     3-4 cigarettes/a day   Vaping Use    Vaping Use: Never used   Substance and Sexual Activity    Alcohol use: Yes     Comment: WEEKENDS 1-2 cans    Drug use: Yes     Types: Cannabis     Comment: Recreationally    Sexual activity: Yes     Partners: Male   Other Topics Concern    Caffeine Concern Yes     Comment: Soda, 3 cans a day    Right Handed Yes        Medications (Active prior to today's visit):  Current Outpatient Medications   Medication Sig Dispense Refill    pantoprazole 40 MG Oral Tab EC Take 1 tablet (40 mg total) by mouth every morning before breakfast. 30 tablet 2    permethrin 5 % External Cream Apply to entire body below the neck, let sit for 8-14 hours then wash off.  Repeat in 1 week 60 g 0    HYDROcodone-acetaminophen 5-325 MG Oral Tab Take 1 tablet by mouth every 6 (six) hours as needed for Pain. 10 tablet 0    albuterol 108 (90 Base) MCG/ACT Inhalation Aero Soln Inhale 2 puffs into the lungs every 6 (six) hours as needed for Wheezing. 1 each 2    nicotine polacrilex 2 MG Mouth/Throat Gum Take 1 each (2 mg total) by mouth as needed for Smoking cessation. 40 each 2       Allergies:  No Known Allergies      ROS:   The patient denies any chest pain or shortness of breath,  No neurologic or dermatologic symptoms.     PHYSICAL EXAM:   Blood pressure 146/79, pulse 74, height 5' 1\" (1.549 m), weight 121 lb (54.9 kg), not currently breastfeeding.    The patient appears their stated age and is in no acute distress  HEENT- anicteric sclera,  neck no lymphadnopathy, OP- clear with no masses or lesions  Chest- Clear bilaterally, no wheezing,  Heart- regular rate, no murmur or gallop  Abdomen- Soft and nontender, nondistended  Ext- no clubbing or cyanosis  Skin- no rashes or lesions  Neuro- appropriate response, alert, no confusion  .  ASSESSMENT/PLAN:   Assessment     GERD  Hiatal hernia  Colon cancer screening    Discussed reflux, hiatal hernia, plan to restart on PPI therapy, advised dietary and behavioral changes to decrease acid reflux events.  Discussed hiatal hernia specifically.  Plan upper endoscopy to reevaluate to determine if the hernia is enlarging.  Also rule out Heard's. Stop smoking.     Colonoscopy due in 2025.    Plan  GERD  Hiatal hernia  - repeat EGD to check the hiatal hernia with MAC  - dietary changes  - restart on pantoprazole x 3 months        Orders This Visit:  No orders of the defined types were placed in this encounter.      Meds This Visit:  Requested Prescriptions     Signed Prescriptions Disp Refills    pantoprazole 40 MG Oral Tab EC 30 tablet 2     Sig: Take 1 tablet (40 mg total) by mouth every morning before breakfast.       Imaging & Referrals:  None       Nhan Mcclendon MD  [facility] Gastroenterology

## 2024-01-17 NOTE — PATIENT INSTRUCTIONS
GERD  Hiatal hernia  - repeat EGD to check the hiatal hernia with MAC  - dietary changes  - restart on pantoprazole x 3 months

## 2024-01-17 NOTE — TELEPHONE ENCOUNTER
Scheduled for:  EGD 19337  Provider Name:  Dr Mcclendon    Date:  4/16/2024  Time:   2:30PM (Patient aware to arrive ONE hour early)  Location:  [facility]    Sedation:  MAC  Prep:  NPO    Meds/Allergies Reconciled?:   Provider Reviewed     Diagnosis with codes:    Hiatal Hernia K44.9   GERD K21.9    Was patient informed to call insurance with codes (Y/N):  Yes     Referral sent?: Referral was sent at the time of electronic surgical scheduling.    EM or [facility] notified?: I sent an electronic request to ENDO Scheduling and received a confirmation today.     Medication Orders:  Patient is aware to NOT take iron pills, herbal meds and diet supplements for 7 days before exam. Also to NOT take any form of alcohol, recreational drugs and any forms of ED meds 24 hours before exam.       Misc Orders:  N/A     Further instructions given by staff:  I Provided prep instructions to patient and reviewed date, time and location. Patient verbalized that  She / Her understood and is aware to call with any questions.    Patient was informed about the new cancellation policy for She / Her procedure. Patient was also given copy of the cancellation policy at the time of the appointment and verbalized understanding.

## 2024-04-15 ENCOUNTER — TELEPHONE (OUTPATIENT)
Facility: CLINIC | Age: 59
End: 2024-04-15

## 2024-04-16 ENCOUNTER — ANESTHESIA (OUTPATIENT)
Dept: ENDOSCOPY | Age: 59
End: 2024-04-16
Payer: MEDICARE

## 2024-04-16 ENCOUNTER — TELEPHONE (OUTPATIENT)
Facility: CLINIC | Age: 59
End: 2024-04-16

## 2024-04-16 ENCOUNTER — ANESTHESIA EVENT (OUTPATIENT)
Dept: ENDOSCOPY | Age: 59
End: 2024-04-16
Payer: MEDICARE

## 2024-04-16 ENCOUNTER — HOSPITAL ENCOUNTER (OUTPATIENT)
Age: 59
Setting detail: HOSPITAL OUTPATIENT SURGERY
Discharge: HOME | End: 2024-04-16
Attending: INTERNAL MEDICINE | Admitting: INTERNAL MEDICINE
Payer: MEDICARE

## 2024-04-16 VITALS
TEMPERATURE: 98 F | OXYGEN SATURATION: 95 % | HEART RATE: 57 BPM | BODY MASS INDEX: 23.98 KG/M2 | WEIGHT: 127 LBS | DIASTOLIC BLOOD PRESSURE: 81 MMHG | RESPIRATION RATE: 14 BRPM | SYSTOLIC BLOOD PRESSURE: 165 MMHG | HEIGHT: 61 IN

## 2024-04-16 DIAGNOSIS — K44.9 HIATAL HERNIA: ICD-10-CM

## 2024-04-16 DIAGNOSIS — K21.9 GASTROESOPHAGEAL REFLUX DISEASE, UNSPECIFIED WHETHER ESOPHAGITIS PRESENT: ICD-10-CM

## 2024-04-16 PROCEDURE — 99070 SPECIAL SUPPLIES PHYS/QHP: CPT | Performed by: INTERNAL MEDICINE

## 2024-04-16 PROCEDURE — 88312 SPECIAL STAINS GROUP 1: CPT | Performed by: INTERNAL MEDICINE

## 2024-04-16 PROCEDURE — 88305 TISSUE EXAM BY PATHOLOGIST: CPT | Performed by: INTERNAL MEDICINE

## 2024-04-16 PROCEDURE — 43239 EGD BIOPSY SINGLE/MULTIPLE: CPT | Performed by: INTERNAL MEDICINE

## 2024-04-16 RX ORDER — SODIUM CHLORIDE, SODIUM LACTATE, POTASSIUM CHLORIDE, CALCIUM CHLORIDE 600; 310; 30; 20 MG/100ML; MG/100ML; MG/100ML; MG/100ML
INJECTION, SOLUTION INTRAVENOUS CONTINUOUS
Status: DISCONTINUED | OUTPATIENT
Start: 2024-04-16 | End: 2024-04-16

## 2024-04-16 RX ORDER — LIDOCAINE HYDROCHLORIDE 10 MG/ML
INJECTION, SOLUTION EPIDURAL; INFILTRATION; INTRACAUDAL; PERINEURAL AS NEEDED
Status: DISCONTINUED | OUTPATIENT
Start: 2024-04-16 | End: 2024-04-16 | Stop reason: SURG

## 2024-04-16 RX ORDER — SODIUM CHLORIDE, SODIUM LACTATE, POTASSIUM CHLORIDE, CALCIUM CHLORIDE 600; 310; 30; 20 MG/100ML; MG/100ML; MG/100ML; MG/100ML
INJECTION, SOLUTION INTRAVENOUS CONTINUOUS
OUTPATIENT
Start: 2024-04-16

## 2024-04-16 RX ORDER — NALOXONE HYDROCHLORIDE 0.4 MG/ML
0.08 INJECTION, SOLUTION INTRAMUSCULAR; INTRAVENOUS; SUBCUTANEOUS ONCE AS NEEDED
OUTPATIENT
Start: 2024-04-16 | End: 2024-04-16

## 2024-04-16 RX ADMIN — SODIUM CHLORIDE, SODIUM LACTATE, POTASSIUM CHLORIDE, CALCIUM CHLORIDE: 600; 310; 30; 20 INJECTION, SOLUTION INTRAVENOUS at 14:43:00

## 2024-04-16 RX ADMIN — LIDOCAINE HYDROCHLORIDE 50 MG: 10 INJECTION, SOLUTION EPIDURAL; INFILTRATION; INTRACAUDAL; PERINEURAL at 14:43:00

## 2024-04-16 NOTE — TELEPHONE ENCOUNTER
Pt calling again.  She states that she needs to know if she has to cancel because her family member that is picking her up, lives far away.   Pt states that she has had a normal BM after speaking with RN this morning.  Please call

## 2024-04-16 NOTE — DISCHARGE INSTRUCTIONS
Home Care Instructions for Gastroscopy with Sedation    Diet:  - Resume your regular diet as tolerated unless otherwise instructed.  - Start with light meals to minimize bloating.  - Do not drink alcohol today.    Medication:  - If you have questions about resuming your normal medications, please contact your Primary Care Physician.    Activities:  - Take it easy today. Do not return to work today.  - Do not drive today.  - Do not operate any machinery today (including kitchen equipment).    Gastroscopy:  - You may have a sore throat for 2-3 days following the exam. This is normal. Gargling with warm salt water (1/2 tsp salt to 1 glass warm water) or using throat lozenges will help.  - If you experience any sharp pain in your neck, abdomen or chest, vomiting of blood, oral temperature over 100 degrees Fahrenheit, light-headedness or dizziness, or any other problems, contact your doctor.    **If unable to reach your doctor, please go to the [facility] Emergency Room**    - Your referring physician will receive a full report of your examination.  - If you do not hear from your doctor's office within two weeks of your biopsy, please call them for your results.    You may be able to see your laboratory results in [employer] between 4 and 7 business days.  In some cases, your physician may not have viewed the results before they are released to [employer].  If you have questions regarding your results contact the physician who ordered the test/exam by phone or via [employer] by choosing \"Ask a Medical Question.\"

## 2024-04-16 NOTE — OPERATIVE REPORT
[facility] Endoscopy Report  Date of procedure-April 16, 2024    Preoperative Diagnosis:  -GERD      Postoperative Diagnosis:  -Hiatal hernia 5 cm  -Schatzki's ring      Procedure:    Esophagogastroduodenoscopy       Surgeon:  Nhan Mcclendon M.D.    Anesthesia:  MAC     Technique:  After informed consent, the patient was placed in the left lateral recumbent position.  An Olympus adult HD gastroscope was inserted into the hypopharynx and advanced under direct vision into the esophagus, stomach and duodenum.  The endoscope was withdrawn to the stomach where retroflexion of the annulus, body, cardia and fundus was performed.  The instrument was straightened, insufflated air and fluid were suctioned and the endoscope was withdrawn.  The procedure was well tolerated without immediate complication.      Findings:  The esophagus showed a Schatzki's ring at the GE junction at 34 cm.  This was biopsied disrupted.  The GE junction and diaphragmatic impression were at 34 cm and 39 cm for a 5 cm hiatal hernia.  No Omar's erosions were noted.  The stomach distended appropriately with insufflated air.  The mucosa of the stomach including cardia, fundus, gastric body and antrum were normal.  The duodenal bulb and post bulbar regions were normal.    Estimated blood loss-insignificant  Specimens-see above    Impression:  -Hiatal hernia 5 cm  -Schatzki's ring    Recommendations:  - Post procedure instructions given  - Continue on PPI  - Follow up on biopsies  - Antireflux dietary and behavioral changes  - Consider surgical input for fundoplication if ongoing issues.         Nhan Mcclendon MD  4/16/2024  2:56 PM

## 2024-04-16 NOTE — ANESTHESIA PREPROCEDURE EVALUATION
Anesthesia PreOp Note    HPI:     Edith Mohamud is a 58 year old female who presents for preoperative consultation requested by: Nhan Mcclendon MD    Date of Surgery: 2024    Procedure(s):  ESOPHAGOGASTRODUODENOSCOPY  Indication: Hiatal hernia /Gastroesophageal reflux disease, unspecified whether esophagitis present    Relevant Problems   No relevant active problems       NPO:  Last Liquid Consumption Date: 24  Last Liquid Consumption Time: 0400  Last Solid Consumption Date: 24  Last Solid Consumption Time: 0200  Last Liquid Consumption Date: 24          History Review:  Patient Active Problem List    Diagnosis Date Noted    Esophageal reflux     Weakness of right upper extremity 2018    Caregiver burden 2018    DDD (degenerative disc disease), cervical 2018    Neck pain, bilateral posterior 2018    Depressive disorder 2017    Asthma with COPD (chronic obstructive pulmonary disease) (formerly [facility]) 2017    Acute chest pain 2016    Azotemia 2016    Abnormal EKG 2016    Chalazion of left upper eyelid 2015    Neck muscle spasm 2015       Past Medical History:    Anxiety state    Arthritis    Asthma (formerly [facility])    Bronchitis    Cervical spine arthritis    Congestive heart disease (formerly [facility])    Dementia (formerly [facility])    Pt denies (22)    Depression    Esophageal reflux    Foot fracture, left    Fracture    R hand (unknown bone)    History of blood transfusion    Pneumonia due to organism    Visual impairment    Glasses       Past Surgical History:   Procedure Laterality Date    Bunionectomy Right       1998    Cholecystectomy      Electrocardiogram, complete  2012    Scanned to Media Tab - Date of Service 2012    Foot surgery Right     bunion removed          x3    Removal gallbladder         Medications Prior to Admission   Medication Sig Dispense Refill Last Dose    pantoprazole 40 MG Oral Tab EC Take 1 tablet (40 mg  total) by mouth every morning before breakfast. 30 tablet 2     HYDROcodone-acetaminophen 5-325 MG Oral Tab Take 1 tablet by mouth every 6 (six) hours as needed for Pain. 10 tablet 0 PRN    albuterol 108 (90 Base) MCG/ACT Inhalation Aero Soln Inhale 2 puffs into the lungs every 6 (six) hours as needed for Wheezing. 1 each 2 PRN    nicotine polacrilex 2 MG Mouth/Throat Gum Take 1 each (2 mg total) by mouth as needed for Smoking cessation. 40 each 2     permethrin 5 % External Cream Apply to entire body below the neck, let sit for 8-14 hours then wash off.  Repeat in 1 week (Patient not taking: Reported on 4/16/2024) 60 g 0 Not Taking     Current Facility-Administered Medications Ordered in Epic   Medication Dose Route Frequency Provider Last Rate Last Admin    lactated ringers infusion   Intravenous Continuous Nhan Mcclendon MD         No current [facility]-ordered outpatient medications on file.       No Known Allergies    Family History   Problem Relation Age of Onset    Heart Disease Mother     Cataracts Mother     Macular degeneration Mother     Dementia Mother     Other (Osteoarthritis) Mother         Osteoarthritis    Stroke Father         per [employer]:  \"CVA (Stroke), (cause of death)\"    Cataracts Father     Glaucoma Father     Other (Myocardial infarction) Father         per [employer]    Cancer Sister         Cancer, pancreatic    Pancreatic Cancer Sister 50    Heart Attack Sister     No Known Problems Sister     No Known Problems Sister     No Known Problems Sister     Other (Other) Brother     No Known Problems Brother     No Known Problems Brother     No Known Problems Brother     No Known Problems Daughter     Diabetes Maternal Aunt      Social History     Socioeconomic History    Marital status: Single   Tobacco Use    Smoking status: Every Day     Types: Cigarettes    Smokeless tobacco: Never    Tobacco comments:     3-4 cigarettes/a day   Vaping Use    Vaping status: Never Used   Substance and Sexual  Activity    Alcohol use: Yes     Comment: WEEKENDS 1-2 cans    Drug use: Yes     Types: Cannabis     Comment: Recreationally    Sexual activity: Yes     Partners: Male   Other Topics Concern    Caffeine Concern Yes     Comment: Soda, 3 cans a day    Right Handed Yes       Available pre-op labs reviewed.             Vital Signs:  Body mass index is 24 kg/m².   height is 1.549 m (5' 1\") and weight is 57.6 kg (127 lb). Her blood pressure is 126/79 and her pulse is 60. Her respiration is 18 and oxygen saturation is 96%.   Vitals:    04/09/24 1611 04/16/24 1407   BP:  126/79   Pulse:  60   Resp:  18   SpO2:  96%   Weight: 54.9 kg (121 lb) 57.6 kg (127 lb)   Height:  1.549 m (5' 1\")        Anesthesia Evaluation     Patient summary reviewed and Nursing notes reviewed    Airway   Mallampati: I  TM distance: >3 FB  Neck ROM: full  Dental - Dentition appears grossly intact     Pulmonary - normal exam   (+) COPD, asthma  Cardiovascular - normal exam  (+) CHF    Neuro/Psych    (+)  neuromuscular disease, anxiety/panic attacks,  depression      GI/Hepatic/Renal    (+) GERD    Endo/Other    (+) arthritis  Abdominal  - normal exam                 Anesthesia Plan:   ASA:  2  Plan:   General  Informed Consent Plan and Risks Discussed With:  Patient  Discussed plan with:  Surgeon      I have informed Edith ARACELI Mohamud and/or legal guardian or family member of the nature of the anesthetic plan, benefits, risks including possible dental damage if relevant, major complications, and any alternative forms of anesthetic management.   All of the patient's questions were answered to the best of my ability. The patient desires the anesthetic management as planned.  AUDRA RIGGINS MD  4/16/2024 2:34 PM  Present on Admission:  **None**

## 2024-04-16 NOTE — TELEPHONE ENCOUNTER
Patient states she has an EGD scheduled for today at 2:30 pm. Patient forgot and ate 3 pieces of BBQ ribs and a cupcake at 1:30 am.  Patient asking if it's okay to proceed with EGD.  Please advise. Thank you.

## 2024-04-16 NOTE — H&P
History & Physical Examination    Patient Name: Edith Mohamud  MRN: [id number]  [facility]: [id number]  YOB: 1965    Diagnosis:   GERD  History of hiatal hernia      Medications Prior to Admission   Medication Sig Dispense Refill Last Dose    pantoprazole 40 MG Oral Tab EC Take 1 tablet (40 mg total) by mouth every morning before breakfast. 30 tablet 2     HYDROcodone-acetaminophen 5-325 MG Oral Tab Take 1 tablet by mouth every 6 (six) hours as needed for Pain. 10 tablet 0 PRN    albuterol 108 (90 Base) MCG/ACT Inhalation Aero Soln Inhale 2 puffs into the lungs every 6 (six) hours as needed for Wheezing. 1 each 2 PRN    nicotine polacrilex 2 MG Mouth/Throat Gum Take 1 each (2 mg total) by mouth as needed for Smoking cessation. 40 each 2     permethrin 5 % External Cream Apply to entire body below the neck, let sit for 8-14 hours then wash off.  Repeat in 1 week (Patient not taking: Reported on 2024) 60 g 0 Not Taking     Current Facility-Administered Medications   Medication Dose Route Frequency    lactated ringers infusion   Intravenous Continuous       Allergies: No Known Allergies    Past Medical History:    Anxiety state    Arthritis    Asthma (HCC)    Bronchitis    Cervical spine arthritis    Congestive heart disease (HCC)    Dementia (HCC)    Pt denies (22)    Depression    Esophageal reflux    Foot fracture, left    Fracture    R hand (unknown bone)    History of blood transfusion    Pneumonia due to organism    Visual impairment    Glasses     Past Surgical History:   Procedure Laterality Date    Bunionectomy Right       1998    Cholecystectomy      Electrocardiogram, complete  2012    Scanned to Media Tab - Date of Service 2012    Foot surgery Right     bunion removed          x3    Removal gallbladder       Family History   Problem Relation Age of Onset    Heart Disease Mother     Cataracts Mother     Macular degeneration Mother     Dementia Mother     Other  (Osteoarthritis) Mother         Osteoarthritis    Stroke Father         per [employer]:  \"CVA (Stroke), (cause of death)\"    Cataracts Father     Glaucoma Father     Other (Myocardial infarction) Father         per [employer]    Cancer Sister         Cancer, pancreatic    Pancreatic Cancer Sister 50    Heart Attack Sister     No Known Problems Sister     No Known Problems Sister     No Known Problems Sister     Other (Other) Brother     No Known Problems Brother     No Known Problems Brother     No Known Problems Brother     No Known Problems Daughter     Diabetes Maternal Aunt      Social History     Tobacco Use    Smoking status: Every Day     Types: Cigarettes    Smokeless tobacco: Never    Tobacco comments:     3-4 cigarettes/a day   Substance Use Topics    Alcohol use: Yes     Comment: WEEKENDS 1-2 cans       SYSTEM Check if Review is Normal Check if Physical Exam is Normal If not normal, please explain:   HEENT [x ] [ x]    NECK & BACK [x ] [x ]    HEART [x ] [ x]    LUNGS [x ] [ x]    ABDOMEN [x ] [x ]    UROGENITAL [ ] [ ]    EXTREMITIES [x ] [x ]    OTHER        [ x ] I have discussed the risks and benefits and alternatives with the patient/family.  They understand and agree to proceed with plan of care.  [ x ] I have reviewed the History and Physical done within the last 30 days.  Any changes noted above.    Nhan Mcclendon MD  4/16/2024  2:30 PM

## 2024-04-16 NOTE — OR NURSING
After procedure, pt complained of irritated upper and lower lip.  Recovery RN noted a small amount of swelling and redness on upper and lower lip from the bite block.  No bleeding seen.  Skin is intact. RN offered some ginger ale, ice bag and some Vaseline to apply to the lips.  Dr Mcclendon made aware of this.

## 2024-04-16 NOTE — TELEPHONE ENCOUNTER
What time did she eat all this ?  Needs 8 hours per anesthesia so we may have to cancel as I lose anesthesia up here at [facility] at 3 pm

## 2024-04-16 NOTE — ANESTHESIA POSTPROCEDURE EVALUATION
Patient: Edith Mohamud    Procedure Summary       Date: 04/16/24 Room / Location: [facility] ENDOSCOPY 01 / [facility] ENDO    Anesthesia Start: 1440 Anesthesia Stop:     Procedure: ESOPHAGOGASTRODUODENOSCOPY Diagnosis:       Hiatal hernia      Gastroesophageal reflux disease, unspecified whether esophagitis present      (Hiatal hernia, schatzki's ring)    Surgeons: Nhan Mcclendon MD Anesthesiologist: Joanne Rodriguez MD    Anesthesia Type: general ASA Status: 2            Anesthesia Type: general    Vitals Value Taken Time   /77 04/16/24 1500   Temp 97.7 °F (36.5 °C) 04/16/24 1500   Pulse 79 04/16/24 1500   Resp 16 04/16/24 1500   SpO2 99 % 04/16/24 1500       [facility] AN Post Evaluation:   Patient Evaluated in PACU  Patient Participation: complete - patient participated  Level of Consciousness: awake  Pain Management: adequate  Airway Patency:patent  Dental exam unchanged from preop  Yes    Cardiovascular Status: acceptable  Respiratory Status: acceptable  Postoperative Hydration acceptable      JOANNE RODRIGUEZ MD  4/16/2024 3:03 PM

## 2024-04-17 ENCOUNTER — TELEPHONE (OUTPATIENT)
Dept: FAMILY MEDICINE CLINIC | Facility: CLINIC | Age: 59
End: 2024-04-17

## 2024-04-17 DIAGNOSIS — M65.341 TRIGGER RING FINGER OF RIGHT HAND: ICD-10-CM

## 2024-04-17 DIAGNOSIS — M62.838 TRAPEZIUS MUSCLE SPASM: Primary | ICD-10-CM

## 2024-04-17 DIAGNOSIS — M65.351 TRIGGER LITTLE FINGER OF RIGHT HAND: ICD-10-CM

## 2024-04-17 DIAGNOSIS — M65.331 TRIGGER MIDDLE FINGER OF RIGHT HAND: ICD-10-CM

## 2024-04-17 NOTE — TELEPHONE ENCOUNTER
Patient asking for referrals to see the hand surgeon she saw last in September 2023, had celestone injections to her fingers . Patient states her hands are \"locking up\" again and she would like to see the specialist.    Patient also asking for a referral to a specialist for her neck pain, Physical therapy helped her for a while and now pain back. Patient asking to see an orthopedic doctor. Please advise if appropriate.      Patient has an upcoming appointment 4/22/24 with SONIDO Herring.

## 2024-04-18 ENCOUNTER — TELEPHONE (OUTPATIENT)
Facility: CLINIC | Age: 59
End: 2024-04-18

## 2024-04-18 NOTE — TELEPHONE ENCOUNTER
I reviewed below message with patient.  She accepted 4pm phone visit tomorrow    She said she had foot surgery in November, then broke other foot so took a lot of time off work so may not be able to get this surgery done right away due to work.    Aware this would just be a phone visit to discuss results/options/plan.    FYI Dr. Mcclendon    Your Appointments      Friday April 19, 2024 4:00 PM  Virtual Phone Visit with Nhan Mcclendon MD  [facility] ([address] [address]  [phone]

## 2024-04-18 NOTE — TELEPHONE ENCOUNTER
----- Message from Nhan Mcclendon MD sent at 4/17/2024  1:11 PM CDT -----  Edith,  EGD showed a hiatal hernia, this has increased in size and I feel is causing your symptoms.    I would advise that you see a surgeon about repair of the hernia.   Nursing to set up telephone encounter to discuss this more fully.   Dr. Mcclendon

## 2024-04-18 NOTE — TELEPHONE ENCOUNTER
Dr. Mcclendon    Please let me know what time/date you want me to offer patient for phone visit.    Thank you

## 2024-04-19 ENCOUNTER — TELEPHONE (OUTPATIENT)
Facility: CLINIC | Age: 59
End: 2024-04-19

## 2024-04-19 DIAGNOSIS — K22.4 ESOPHAGEAL MOTILITY DISORDER: Primary | ICD-10-CM

## 2024-04-19 NOTE — TELEPHONE ENCOUNTER
Nhan Mcclendon MD at 4/19/2024  4:14 PM    Status: Signed   Virtual Telephone Check-In     Edith Mohamud verbally consents to a Virtual/Telephone Check-In visit on 04/19/24.  Patient has been referred to the [facility] website at [email or web address] to review the yearly Consent to Treat document.     Patient understands and accepts financial responsibility for any deductible, co-insurance and/or co-pays associated with this service.     Duration of the service: 10 minutes     Summary of topics discussed:      The patient is a 58-year-old female who has a history of chronic reflux, dyspepsia, refractory reflux symptoms, progressive hiatal hernia and intermittent dysphagia with Schatzki's ring.     Discussed her options here, hiatal hernia has enlarged since her last upper endoscopy years ago.  My concern is that this will continue to get bigger and she will continue to have more progressive symptoms.  Discussed her options here.  She has been on chronic medical management for reflux and she continues to have regurgitation and symptoms despite PPI therapy which has been chronic.     Advised motility study and surgical input for possible fundoplication.  Referral placed.     Nursing staff to assist with getting the motility study set up we usually set these up through [facility].      Nhan Mcclendon MD

## 2024-04-19 NOTE — TELEPHONE ENCOUNTER
The office closed at 4pm  Will call Gastroenterology out of [facility] on Monday when they open to follow up on referral process  # [phone]

## 2024-04-22 ENCOUNTER — OFFICE VISIT (OUTPATIENT)
Dept: FAMILY MEDICINE CLINIC | Facility: CLINIC | Age: 59
End: 2024-04-22

## 2024-04-22 VITALS
HEART RATE: 66 BPM | HEIGHT: 61 IN | SYSTOLIC BLOOD PRESSURE: 123 MMHG | WEIGHT: 120.19 LBS | DIASTOLIC BLOOD PRESSURE: 70 MMHG | BODY MASS INDEX: 22.69 KG/M2

## 2024-04-22 DIAGNOSIS — M54.2 NECK PAIN ON LEFT SIDE: Primary | ICD-10-CM

## 2024-04-22 DIAGNOSIS — R29.898 NECK TIGHTNESS: ICD-10-CM

## 2024-04-22 PROCEDURE — 99213 OFFICE O/P EST LOW 20 MIN: CPT

## 2024-04-22 PROCEDURE — 96127 BRIEF EMOTIONAL/BEHAV ASSMT: CPT

## 2024-04-22 RX ORDER — CYCLOBENZAPRINE HCL 5 MG
5 TABLET ORAL NIGHTLY
Qty: 15 TABLET | Refills: 0 | Status: SHIPPED | OUTPATIENT
Start: 2024-04-22

## 2024-04-22 RX ORDER — NAPROXEN 250 MG/1
250 TABLET ORAL 2 TIMES DAILY WITH MEALS
Qty: 30 TABLET | Refills: 0 | Status: SHIPPED | OUTPATIENT
Start: 2024-04-22

## 2024-04-22 NOTE — PROGRESS NOTES
Subjective:   Edith Mohamud is a 58 year old female who presents for Neck Pain (Past week, something popped in the neck and tender, put heat and ice )   Patient is a pleasant 58-year-old female with a past medical history consistent for anxiety, OA, asthma, CHF, dementia, reflux, depression, and hiatal hernia.  Patient presents to office today for neck pain.  Patient states she was doing things around the house. She has an issue with her neck. She has been to PT in the past. This was 2-3 days ago. She has tried heat packs which help somewhat.     Of note patient recently obtained EGD with Dr. Mcclendon on 2024.  Patient has progressive hiatal hernia with worsening of symptoms.  Workup continues for correction.    Trauma? Denies   Red flags? Fever, Chills, ivdu, long term steroids, unexplained weight loss, hx of cancer, headache or visual changes, anterior neck pain or chest pain? Negative   Neuro Involvement? Weakness, numbness, bowel or bladder issues, Gait abnormalities? Negative   Upper weakness or numbness? Negative   Pain, stiffness, decreased rom? Positive pain 3/10. TTP. Stiff and decreased ROM.     Lihmettes? Negative  Spurling? Negative   Upper Limb Tension Test? Negative   Distraction Test? Negative          Past Medical History:    Anxiety state    Arthritis    Asthma (HCC)    Bronchitis    Cervical spine arthritis    Congestive heart disease (HCC)    Dementia (HCC)    Pt denies (22)    Depression    Esophageal reflux    Foot fracture, left    Fracture    R hand (unknown bone)    History of blood transfusion    Pneumonia due to organism    Visual impairment    Glasses      Past Surgical History:   Procedure Laterality Date    Bunionectomy Right       1998    Cholecystectomy      Egd  2024    Dr. Mcclendon: Hiatal hernia, schatzki's ring    Electrocardiogram, complete  2012    Scanned to Media Tab - Date of Service 2012    Foot surgery Right     bunion removed           x3    Removal gallbladder          History/Other:    Chief Complaint Reviewed and Verified  Nursing Notes Reviewed and   Verified  Tobacco Reviewed  Allergies Reviewed  Medications Reviewed    Problem List Reviewed  Medical History Reviewed  Surgical History   Reviewed  OB Status Reviewed  Family History Reviewed  Social History   Reviewed         Tobacco:  She currently smokes tobacco.  Social History     Tobacco Use   Smoking Status Every Day    Types: Cigarettes   Smokeless Tobacco Never   Tobacco Comments    3-4 cigarettes/a day      Tobacco Cessation Documentation (Smoking and Smokeless included):  I had an in depth therapy session with Edith Mohamud about her tobacco use risks and options using the [employer]PSTF's Five A's approach:    Ask: Edith is using tobacco products.  Assess: We asked about/assessed behavioral health risk and factors affecting choice of behavior change goals/methods.  Specifically I asked about readiness to quit tobacco.  Advise: We gave clear, specific, and personalized behavior change advice, including information about personal health harms and benefits. Specifically, she was told that Quitting tobacco is one of the best things she can do for her health. I strongly encouraged her to quit.      Agree: We collaboratively selected appropriate treatment goals and methods based on the patient’s interest in and willingness to change the behavior.   Assist: We used behavior change techniques (self-help and/or counseling), to aid Edith in achieving agreed-upon goals by acquiring the skills, confidence, and social/environmental supports for behavior change, supplemented with adjunctive medical treatments when appropriate. Additionally, national quitting tobacco aides were discussed.   Arrange: Follow up at next visit- see specific follow up below.    Time Counseled: <1 minutes       Current Outpatient Medications   Medication Sig Dispense Refill    naproxen 250 MG Oral Tab Take 1  tablet (250 mg total) by mouth 2 (two) times daily with meals. 30 tablet 0    cyclobenzaprine 5 MG Oral Tab Take 1 tablet (5 mg total) by mouth nightly. 15 tablet 0    pantoprazole 40 MG Oral Tab EC Take 1 tablet (40 mg total) by mouth every morning before breakfast. 30 tablet 2    albuterol 108 (90 Base) MCG/ACT Inhalation Aero Soln Inhale 2 puffs into the lungs every 6 (six) hours as needed for Wheezing. 1 each 2    nicotine polacrilex 2 MG Mouth/Throat Gum Take 1 each (2 mg total) by mouth as needed for Smoking cessation. 40 each 2    permethrin 5 % External Cream Apply to entire body below the neck, let sit for 8-14 hours then wash off.  Repeat in 1 week (Patient not taking: Reported on 4/16/2024) 60 g 0    HYDROcodone-acetaminophen 5-325 MG Oral Tab Take 1 tablet by mouth every 6 (six) hours as needed for Pain. (Patient not taking: Reported on 4/22/2024) 10 tablet 0         Review of Systems:  Review of Systems   Constitutional:  Positive for activity change. Negative for chills and fever.   Respiratory:  Negative for shortness of breath.    Cardiovascular:  Negative for chest pain.   Musculoskeletal:  Positive for arthralgias, myalgias, neck pain and neck stiffness. Negative for back pain, gait problem and joint swelling.   Neurological:  Negative for dizziness, numbness and headaches.         Objective:   /70 (BP Location: Right arm, Patient Position: Sitting, Cuff Size: adult)   Pulse 66   Ht 5' 1\" (1.549 m)   Wt 120 lb 3.2 oz (54.5 kg)   BMI 22.71 kg/m²  Estimated body mass index is 22.71 kg/m² as calculated from the following:    Height as of this encounter: 5' 1\" (1.549 m).    Weight as of this encounter: 120 lb 3.2 oz (54.5 kg).  Physical Exam  Vitals and nursing note reviewed.   Constitutional:       Appearance: Normal appearance. She is normal weight.   HENT:      Right Ear: External ear normal.      Left Ear: External ear normal.   Cardiovascular:      Rate and Rhythm: Normal rate and  regular rhythm.      Pulses: Normal pulses.      Heart sounds: Normal heart sounds.   Pulmonary:      Effort: Pulmonary effort is normal.      Breath sounds: Normal breath sounds.   Musculoskeletal:         General: Tenderness present. No swelling, deformity or signs of injury.      Right lower leg: No edema.      Left lower leg: No edema.      Comments: TTP left latisimus dorsi superior.   Skin:     Capillary Refill: Capillary refill takes less than 2 seconds.   Neurological:      Mental Status: She is alert and oriented to person, place, and time.           Assessment & Plan:   1. Neck pain on left side (Primary)  Assessment & Plan:  Recurrent  Has tried her at home measures  Turned the wrong way and now limited in ROM  Start cyclobenzaprine nightly   Naproxen bid prn  Referral to PT for eval and treat.    Patient aware of plan of care. All questions answered to satisfaction of the patient. Patient instructed to call office or reach out via [employer] if any issues arise. For urgent issues and/or reviewed red flags please proceed to the urgent care or ER.  Also, inform the nurse practitioner with any new symptoms or medication side effects.      Orders:  -     Naproxen; Take 1 tablet (250 mg total) by mouth 2 (two) times daily with meals.  Dispense: 30 tablet; Refill: 0  -     Physical Therapy Referral - [facility]  2. Neck tightness  Assessment & Plan:  Recurrent  Has tried her at home measures  Turned the wrong way and now limited in ROM  Start cyclobenzaprine nightly   Naproxen bid prn  Referral to PT for eval and treat.  Orders:  -     Cyclobenzaprine HCl; Take 1 tablet (5 mg total) by mouth nightly.  Dispense: 15 tablet; Refill: 0  -     Physical Therapy Referral - [place] Locations        Return if symptoms worsen or fail to improve, for Annual physical.    Nhan Herring, APRN, 4/21/2024, 8:48 PM

## 2024-04-22 NOTE — ASSESSMENT & PLAN NOTE
Recurrent  Has tried her at home measures  Turned the wrong way and now limited in ROM  Start cyclobenzaprine nightly   Naproxen bid prn  Referral to PT for eval and treat.

## 2024-04-22 NOTE — ASSESSMENT & PLAN NOTE
Recurrent  Has tried her at home measures  Turned the wrong way and now limited in ROM  Start cyclobenzaprine nightly   Naproxen bid prn  Referral to PT for eval and treat.    Patient aware of plan of care. All questions answered to satisfaction of the patient. Patient instructed to call office or reach out via [employer] if any issues arise. For urgent issues and/or reviewed red flags please proceed to the urgent care or ER.  Also, inform the nurse practitioner with any new symptoms or medication side effects.

## 2024-04-22 NOTE — TELEPHONE ENCOUNTER
Referral signed.    Island Pedicle Flap Text: The defect edges were debeveled with a #15 scalpel blade.  Given the location of the defect, shape of the defect and the proximity to free margins an island pedicle advancement flap was deemed most appropriate.  Using a sterile surgical marker, an appropriate advancement flap was drawn incorporating the defect, outlining the appropriate donor tissue and placing the expected incisions within the relaxed skin tension lines where possible.    The area thus outlined was incised deep to adipose tissue with a #15 scalpel blade.  The skin margins were undermined to an appropriate distance in all directions around the primary defect and laterally outward around the island pedicle utilizing iris scissors.  There was minimal undermining beneath the pedicle flap. Adjacent tissue was incised and carried over to close the defect.

## 2024-04-24 NOTE — TELEPHONE ENCOUNTER
I faxed referral to [facility].    Patient contacted.  Aware she needs to make appointments with General surgery and [facility] Motility Specialist.  I gave her the phone numbers to make both appointments which she wrote down.  Aware [facility] does not do motility testing, she would have to go to [facility] in the city for that.  I told her I would also send a [employer] message with this information.

## 2024-05-31 ENCOUNTER — TELEPHONE (OUTPATIENT)
Facility: CLINIC | Age: 59
End: 2024-05-31

## 2024-05-31 NOTE — TELEPHONE ENCOUNTER
Patient contacted and given phone number to set up appointment with esophageal motility specialist out of [facility] and they will likely set up motility testing for her.  She told me that she needs a referral sent to [facility] again because she was told that they didn't receive it.  I faxed the referral again today and advised she call to make her appointment on Tuesday or Wednesday to allow a day for the referral to be received before she schedules.    I also gave her the number to set up an appointment with one of our general surgeons.

## 2024-05-31 NOTE — TELEPHONE ENCOUNTER
Patient states that she lost the name of doctors that she should see at [facility].  She also states she was supposed to have some testing done too.  Please call.

## 2024-06-10 ENCOUNTER — TELEPHONE (OUTPATIENT)
Facility: CLINIC | Age: 59
End: 2024-06-10

## 2024-06-10 NOTE — TELEPHONE ENCOUNTER
Left detailed message on voicemail informing patient that I faxed the referral again and advised that she wait 24-48 hours before calling to schedule appointment to allow this to be scanned in.  I also provided the number for her to call to set up appointment at [facility].    I advised that she call me back if she has any trouble scheduling the appointment.

## 2024-06-10 NOTE — TELEPHONE ENCOUNTER
Patient states she was instructed to have a test done at [facility] but they do not have orders.  Patient is requesting orders to be faxed to [phone].  Patient is requesting RN to call her to give her details to the orders - please call.  Thank you.

## 2024-06-10 NOTE — TELEPHONE ENCOUNTER
I faxed the referral back in April, so I called [facility] to find out what happened and spoke to Mari.  I was told she can't locate it, advised I fax it directly to the esophageal team instead this time to make sure it gets filed correctly so that it can be found.  I was told to put it attention to Dr. Italo Coto and to fax it directly to [phone] which was done.    Once I get a successful fax confirmation to this number, I will notify the patient.

## 2024-07-03 ENCOUNTER — TELEPHONE (OUTPATIENT)
Dept: FAMILY MEDICINE CLINIC | Facility: CLINIC | Age: 59
End: 2024-07-03

## 2024-07-03 NOTE — TELEPHONE ENCOUNTER
Patient is overdue for her medicare px. Left VM.    Please schedule appointment when call is returned.

## 2024-08-18 ENCOUNTER — HOSPITAL ENCOUNTER (EMERGENCY)
Facility: HOSPITAL | Age: 59
Discharge: HOME | End: 2024-08-18
Attending: EMERGENCY MEDICINE
Payer: MEDICAID

## 2024-08-18 VITALS
DIASTOLIC BLOOD PRESSURE: 76 MMHG | BODY MASS INDEX: 24 KG/M2 | OXYGEN SATURATION: 97 % | RESPIRATION RATE: 16 BRPM | HEART RATE: 74 BPM | TEMPERATURE: 97 F | WEIGHT: 125 LBS | SYSTOLIC BLOOD PRESSURE: 122 MMHG

## 2024-08-18 DIAGNOSIS — H00.014 HORDEOLUM EXTERNUM OF LEFT UPPER EYELID: Primary | ICD-10-CM

## 2024-08-18 PROCEDURE — 99283 EMERGENCY DEPT VISIT LOW MDM: CPT

## 2024-08-18 RX ORDER — ERYTHROMYCIN 5 MG/G
1 OINTMENT OPHTHALMIC EVERY 6 HOURS
Qty: 1 G | Refills: 0 | Status: SHIPPED | OUTPATIENT
Start: 2024-08-18 | End: 2024-08-25

## 2024-08-19 NOTE — DISCHARGE INSTRUCTIONS
Apply ointment to eye as directed.  Apply warm compresses to the left eye 3-4 times daily.  Follow-up with ophthalmology no lesion does not resolve over the next 2 weeks.  Return to the emergency department if greatly increased swelling, pain, fever, or other new symptoms develop.

## 2024-08-19 NOTE — ED PROVIDER NOTES
Patient Seen in: [facility] Emergency Department      History     Chief Complaint   Patient presents with    Eye Visual Problem     Stated Complaint: eye injury    Subjective:   HPI    58-year-old female with history of congestive heart disease, asthma, bronchitis, anxiety, and depression presents with complaints of a lesion to her left upper eyelid.  The patient first noted a sore to her left upper eyelid 6 days ago.  She has been applying triple antibiotic ointment but states that the sore persists and has gotten larger.  She reports irritation to the eyelid.  She denies any vision changes.  She denies fever.  She is otherwise without complaints.    Objective:   Past Medical History:    Anxiety state    Arthritis    Asthma (HCC)    Bronchitis    Cervical spine arthritis    Congestive heart disease (HCC)    Dementia (HCC)    Pt denies (22)    Depression    Esophageal reflux    Foot fracture, left    Fracture    R hand (unknown bone)    History of blood transfusion    Pneumonia due to organism    Visual impairment    Glasses              Past Surgical History:   Procedure Laterality Date    Bunionectomy Right       1998    Cholecystectomy      Egd  2024    Dr. Mcclendon: Hiatal hernia, schatzki's ring    Electrocardiogram, complete  2012    Scanned to Media Tab - Date of Service 2012    Foot surgery Right     bunion removed          x3    Removal gallbladder                  Social History     Socioeconomic History    Marital status: Single   Tobacco Use    Smoking status: Every Day     Types: Cigarettes    Smokeless tobacco: Never    Tobacco comments:     3-4 cigarettes/a day   Vaping Use    Vaping status: Never Used   Substance and Sexual Activity    Alcohol use: Yes     Comment: WEEKENDS 1-2 cans    Drug use: Yes     Types: Cannabis     Comment: Recreationally    Sexual activity: Yes     Partners: Male   Other Topics Concern    Caffeine Concern Yes     Comment: Soda,  3 cans a day    Right Handed Yes              Review of Systems    Positive for stated Chief Complaint: Eye Visual Problem    Other systems are as noted in HPI.  Constitutional and vital signs reviewed.      All other systems reviewed and negative except as noted above.    Physical Exam     ED Triage Vitals [08/18/24 1952]   /75   Pulse 65   Resp 18   Temp 96.5 °F (35.8 °C)   Temp src Temporal   SpO2 96 %   O2 Device None (Room air)       Current Vitals:   Vital Signs  BP: 123/75  Pulse: 65  Resp: 18  Temp: 96.5 °F (35.8 °C)  Temp src: Temporal    Oxygen Therapy  SpO2: 96 %  O2 Device: None (Room air)            Physical Exam    General: Pt is alert  Eye: The left upper lid has a stye along the lid margin.  3 mm across with erythema and mild tenderness.  No drainage.   Pupils:  are equal round and reactive to light and accomadation.   Conjunctiva: No evidence of scleral icterus, subconjunctival hemorrhage or abrasion. There is no scleral injection or foreign body.   EOMI.   ENT:Normal oropharynx, normal nasal exam.  Lungs: Clear to auscultation bilaterally without wheezes, rales or rhonchi.  CV: Regular rate and rhythm.              ED Course   Labs Reviewed - No data to display                 MDM      Patient with stye to the left upper eyelid.  Will give antibiotic ointment and recommend warm compresses.  Will give ophthalmology follow-up with her symptoms do not resolve.                                   Medical Decision Making      Disposition and Plan     Clinical Impression:  1. Hordeolum externum of left upper eyelid         Disposition:  Discharge  8/18/2024  9:36 pm    Follow-up:  Akash Tsai MD  [address] [id number] [phone]    Follow up            Medications Prescribed:  Current Discharge Medication List        START taking these medications    Details   erythromycin 5 MG/GM Ophthalmic Ointment Apply 1 Application to eye every 6 (six) hours for 7  days.  Qty: 1 g, Refills: 0

## 2025-03-09 ENCOUNTER — HOSPITAL ENCOUNTER (OUTPATIENT)
Age: 60
Discharge: HOME | End: 2025-03-09
Payer: MEDICARE

## 2025-03-09 VITALS
TEMPERATURE: 97 F | OXYGEN SATURATION: 96 % | HEART RATE: 82 BPM | SYSTOLIC BLOOD PRESSURE: 122 MMHG | DIASTOLIC BLOOD PRESSURE: 71 MMHG | RESPIRATION RATE: 18 BRPM

## 2025-03-09 DIAGNOSIS — N30.01 ACUTE CYSTITIS WITH HEMATURIA: Primary | ICD-10-CM

## 2025-03-09 LAB
BILIRUB UR QL STRIP: NEGATIVE
CLARITY UR: CLEAR
COLOR UR: YELLOW
GLUCOSE UR STRIP-MCNC: NEGATIVE MG/DL
KETONES UR STRIP-MCNC: NEGATIVE MG/DL
NITRITE UR QL STRIP: NEGATIVE
PH UR STRIP: 5.5 [PH]
PROT UR STRIP-MCNC: NEGATIVE MG/DL
SP GR UR STRIP: <=1.005
UROBILINOGEN UR STRIP-ACNC: <2 MG/DL

## 2025-03-09 PROCEDURE — 81002 URINALYSIS NONAUTO W/O SCOPE: CPT | Performed by: NURSE PRACTITIONER

## 2025-03-09 PROCEDURE — 99213 OFFICE O/P EST LOW 20 MIN: CPT | Performed by: NURSE PRACTITIONER

## 2025-03-09 RX ORDER — CEPHALEXIN 500 MG/1
500 CAPSULE ORAL 2 TIMES DAILY
Qty: 14 CAPSULE | Refills: 0 | Status: SHIPPED | OUTPATIENT
Start: 2025-03-09 | End: 2025-03-09

## 2025-03-09 RX ORDER — CEPHALEXIN 500 MG/1
500 CAPSULE ORAL 2 TIMES DAILY
Qty: 14 CAPSULE | Refills: 0 | Status: SHIPPED | OUTPATIENT
Start: 2025-03-09 | End: 2025-03-16

## 2025-03-09 NOTE — DISCHARGE INSTRUCTIONS
As discussed, it appears you have a UTI/bladder infection on urine provided.  We will start you on antibiotics and send urine for culture.  Results will be available in 48 hours.  Some will contact you if we need to change antibiotics/stop antibiotics/continue antibiotics.    The meantime, drink plenty of water and stay well-hydrated.  I would also recommend back care tips of avoiding heavy lifting, strenuous physical activity.  Use proper dynamics when lifting or assisting son in wheelchair.  You may apply heat to low back 4 times a day for 15 minutes.  I also recommend topical analgesics such as: IcyHot, Bengay, Tiger balm.  Lidocaine patches are now over-the-counter.  You may also take Tylenol and Motrin for your pain.    If urine culture is negative, we can assume that this is a lower back strain, continue the above therapy for at least 2 weeks.  If you have no improvement of your symptoms after 2 weeks, please follow-up with your primary care doctor or return to immediate care for any new or worsening symptoms

## 2025-03-09 NOTE — ED INITIAL ASSESSMENT (HPI)
Pt c/o pain to LLQ that radiates to left buttock area x 2 days sts that she is constipated denies urinary sx denies fever

## 2025-03-09 NOTE — ED PROVIDER NOTES
Patient Seen in: [facility]      History     Chief Complaint   Patient presents with    Pain     Stated Complaint: Left Side Pain    Subjective: This is a 59-year-old female, past medical history of anxiety, arthritis, asthma, depression, dementia, congestive heart disease, GERD, presents to immediate care clinic with complaints of back pain for the past 2 days.  Patient denies that pain radiates to flank, abdomen, groin.  No associated nausea, vomiting, diarrhea.  Eating and drinking normally without pain or symptom exacerbation.  Patient denies urinary symptoms of: Urinary frequency, dysuria, hematuria, retention.  However, she does state that the symptoms feel similar to when she had a bladder infection in the past.  Does state that she has not been drinking enough water lately and likely is dehydrated.  Left lower back pain does not radiate to lower extremities.  It is worse with palpation and position changes.  Amatory in immediate care with steady gait.  AOx4.  The history is provided by the patient.             Objective:     No pertinent past medical history.            No pertinent past surgical history.              No pertinent social history.            Review of Systems   Constitutional: Negative.    HENT: Negative.     Eyes: Negative.    Respiratory: Negative.     Cardiovascular: Negative.    Gastrointestinal: Negative.    Genitourinary: Negative.    Musculoskeletal:  Positive for back pain. Negative for arthralgias, gait problem, joint swelling, myalgias, neck pain and neck stiffness.   Skin: Negative.    Neurological: Negative.        Positive for stated complaint: Left Side Pain  Other systems are as noted in HPI.  Constitutional and vital signs reviewed.      All other systems reviewed and negative except as noted above.    Physical Exam     ED Triage Vitals [03/09/25 0817]   /71   Pulse 82   Resp 18   Temp 97 °F (36.1 °C)   Temp src Oral   SpO2 96 %   O2 Device None (Room air)        Current Vitals:   Vital Signs  BP: 122/71  Pulse: 82  Resp: 18  Temp: 97 °F (36.1 °C)  Temp src: Oral    Oxygen Therapy  SpO2: 96 %  O2 Device: None (Room air)        Physical Exam  Constitutional:       General: She is not in acute distress.     Appearance: Normal appearance. She is not ill-appearing or toxic-appearing.   HENT:      Head: Normocephalic.      Right Ear: External ear normal.      Left Ear: External ear normal.   Cardiovascular:      Rate and Rhythm: Normal rate and regular rhythm.      Pulses: Normal pulses.      Heart sounds: Normal heart sounds.   Pulmonary:      Effort: Pulmonary effort is normal.      Breath sounds: Normal breath sounds.   Abdominal:      General: Abdomen is flat.      Tenderness: There is no abdominal tenderness. There is no right CVA tenderness, left CVA tenderness, guarding or rebound.   Musculoskeletal:         General: Tenderness present. Normal range of motion.      Cervical back: Normal range of motion.      Lumbar back: Tenderness present. No swelling, edema, deformity, signs of trauma, spasms or bony tenderness. Negative right straight leg raise test and negative left straight leg raise test.   Skin:     General: Skin is warm.      Capillary Refill: Capillary refill takes less than 2 seconds.   Neurological:      General: No focal deficit present.      Mental Status: She is alert and oriented to person, place, and time.             ED Course     Labs Reviewed   H POCT URINALYSIS DIPSTICK - Abnormal; Notable for the following components:       Result Value    Blood, Urine Trace-Intact (*)     Leukocyte esterase urine Small (*)     All other components within normal limits   URINE CULTURE, ROUTINE                   MDM      Differentials considered include: Acute cystitis with hematuria, acute cystitis without hematuria, lumbar strain, sciatica,    Patient has left lower back pain for the past 2 days.  While triage note does state that it radiates to left lower  quadrant, patient denies this.  No left flank pain or left lower quadrant abdominal pain.    Patient abdomen soft and nontender on exam.  No rebound or guarding.  No peritoneal signs.  No evidence of acute intra-abdominal abnormality.  No associated nausea, vomiting, diarrhea.  No fever, chills, fatigue.  Eating and drinking normally without pain exacerbation.    On examination of low back, can reproduce left lower back pain with palpation of left lumbar muscles.  No tenderness to lumbar spine at midline.  Patient nontender over SI notch.  Pain does not radiate to lower extremities.  No loss of bowel or bladder.  No saddle anesthesia.  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  Unlikely to be sciatica on physical examination.  Strength to lower extremities with resistance applied.  Adequate plantar and dorsiflexion.  Straight leg test negative for radiculopathy.  However, lumbar strain is still within differential.    Patient has no urinary symptoms.  However, she does states she has a history of UTI/bladder infection and these symptoms feel similar.  She is without urinary frequency, urgency, retention, hematuria, dysuria.  No CVA tenderness on exam.  No pyelonephritis or nephrolithiasis.    Urine obtained, small amount of blood and trace amount of leukocytes.  Will send for culture.  Will start patient on Keflex twice a day for 7 days.  Patient is aware of pending urine culture with results in 48 hours.    Did encourage patient to continue back care, heat, ice, Tylenol, Motrin, topical analgesics, lidocaine patches excetra.    Patient is aware of signs symptoms that warrant immediate reevaluation by either PCP or immediate care.    Did discuss with patient signs and symptoms that would warrant emergency room evaluation.        Medical Decision Making      Disposition and Plan     Clinical Impression:  1. Acute cystitis with  hematuria         Disposition:  Discharge  3/9/2025  8:44 am    Follow-up:  Yogi Dover DO  [address] [facility] [address]  [place] [address]  [phone]    In 7 days  If symptoms worsen          Medications Prescribed:  Discharge Medication List as of 3/9/2025  8:44 AM              Supplementary Documentation:

## 2025-03-13 ENCOUNTER — HOSPITAL ENCOUNTER (EMERGENCY)
Facility: HOSPITAL | Age: 60
Discharge: HOME | End: 2025-03-13
Attending: EMERGENCY MEDICINE
Payer: MEDICARE

## 2025-03-13 ENCOUNTER — TELEPHONE (OUTPATIENT)
Dept: FAMILY MEDICINE CLINIC | Facility: CLINIC | Age: 60
End: 2025-03-13

## 2025-03-13 VITALS
HEART RATE: 94 BPM | SYSTOLIC BLOOD PRESSURE: 131 MMHG | HEIGHT: 61 IN | DIASTOLIC BLOOD PRESSURE: 78 MMHG | WEIGHT: 124 LBS | TEMPERATURE: 97 F | RESPIRATION RATE: 18 BRPM | BODY MASS INDEX: 23.41 KG/M2 | OXYGEN SATURATION: 95 %

## 2025-03-13 DIAGNOSIS — M54.50 ACUTE LEFT-SIDED LOW BACK PAIN WITHOUT SCIATICA: Primary | ICD-10-CM

## 2025-03-13 PROCEDURE — 96372 THER/PROPH/DIAG INJ SC/IM: CPT

## 2025-03-13 PROCEDURE — 99283 EMERGENCY DEPT VISIT LOW MDM: CPT

## 2025-03-13 RX ORDER — ACETAMINOPHEN 500 MG
1000 TABLET ORAL EVERY 6 HOURS PRN
Qty: 56 TABLET | Refills: 0 | Status: SHIPPED | OUTPATIENT
Start: 2025-03-13 | End: 2025-03-27

## 2025-03-13 RX ORDER — LIDOCAINE 50 MG/G
1 PATCH TOPICAL EVERY 24 HOURS
Qty: 14 PATCH | Refills: 0 | Status: SHIPPED | OUTPATIENT
Start: 2025-03-13 | End: 2025-03-27

## 2025-03-13 RX ORDER — KETOROLAC TROMETHAMINE 15 MG/ML
15 INJECTION, SOLUTION INTRAMUSCULAR; INTRAVENOUS ONCE
Status: COMPLETED | OUTPATIENT
Start: 2025-03-13 | End: 2025-03-13

## 2025-03-13 RX ORDER — ACETAMINOPHEN 500 MG
1000 TABLET ORAL ONCE
Status: COMPLETED | OUTPATIENT
Start: 2025-03-13 | End: 2025-03-13

## 2025-03-13 RX ORDER — IBUPROFEN 200 MG
400 TABLET ORAL EVERY 6 HOURS PRN
Qty: 56 TABLET | Refills: 0 | Status: SHIPPED | OUTPATIENT
Start: 2025-03-13 | End: 2025-03-27

## 2025-03-13 NOTE — TELEPHONE ENCOUNTER
Patient went to ER today.   Patient called to see if PCP can prescribe a muscle relaxer.     She was given lidocaine patch, and given ketorolac injection.   Patient informed she will need follow up appointment.     Patient will rest and try medications she was recommended. She will call back to schedule appointment.

## 2025-03-13 NOTE — ED INITIAL ASSESSMENT (HPI)
Pt presents to the ER with c/o left lower back pain since Sat s/p fall. States she tripped on cords and fell on left side hitting night stand with lower back.     Went to IC on Monday where she was started on PO antibiotic after a UA. States no imaging was done for fall    Since fall having a lot of pain.    Denies LOC or hitting her head

## 2025-03-13 NOTE — DISCHARGE INSTRUCTIONS
For your pain you may take the following:  -Acetaminophen (tylenol) 1000mg (two extra strength tablets) every 6 hours  -Ibuprofen (motrin) 400mg (two regular strength tablets) every 6 hours  -You should alternate between the two every 3 hours for the next 2 days, then switch to as needed.   -Change the lidocaine patch once per day  -Take the medications with food to avoid an upset stomach.

## 2025-03-13 NOTE — ED PROVIDER NOTES
Patient Seen in: [facility] Emergency Department      History     Chief Complaint   Patient presents with    Fall    Pain     Stated Complaint: left leg pain after trip    Subjective:   HPI          Objective:     Past Medical History:    Anxiety state    Arthritis    Asthma (HCC)    Bronchitis    Cervical spine arthritis    Congestive heart disease (HCC)    Dementia (HCC)    Pt denies (22)    Depression    Esophageal reflux    Foot fracture, left    Fracture    R hand (unknown bone)    History of blood transfusion    Pneumonia due to organism    Visual impairment    Glasses              Past Surgical History:   Procedure Laterality Date    Bunionectomy Right       1998    Cholecystectomy      Egd  2024    Dr. Mcclendon: Hiatal hernia, schatzki's ring    Electrocardiogram, complete  2012    Scanned to Media Tab - Date of Service 2012    Foot surgery Right     bunion removed          x3    Removal gallbladder                  Social History     Socioeconomic History    Marital status: Single   Tobacco Use    Smoking status: Every Day     Types: Cigarettes    Smokeless tobacco: Never    Tobacco comments:     3-4 cigarettes/a day   Vaping Use    Vaping status: Never Used   Substance and Sexual Activity    Alcohol use: Yes     Comment: WEEKENDS 1-2 cans    Drug use: Yes     Types: Cannabis     Comment: Recreationally    Sexual activity: Yes     Partners: Male   Other Topics Concern    Caffeine Concern Yes     Comment: Soda, 3 cans a day    Right Handed Yes                  Physical Exam     ED Triage Vitals   BP 25 0949 131/78   Pulse 25 0947 94   Resp 25 0947 18   Temp 25 0947 96.8 °F (36 °C)   Temp src 25 0947 Temporal   SpO2 25 0947 95 %   O2 Device 25 0947 None (Room air)       Current Vitals:   Vital Signs  BP: 131/78  Pulse: 94  Resp: 18  Temp: 96.8 °F (36 °C)  Temp src: Temporal    Oxygen Therapy  SpO2: 95 %  O2 Device: None  (Room air)        Physical Exam        ED Course   Labs Reviewed - No data to display                MDM      59 year Old female with history of CHF and asthma presents today with low back pain.  Patient states that 5 days ago she tripped on a cord in her bedroom and strained her back and struck it on an end table.  Since then, she has had some ongoing and slightly increasing pain at the site.  Denies any numbness/weakness in the lower extremities, urinary retention, incontinence, fever, or other symptoms.  She was seen on Monday at an immediate care where urinalysis was performed and she was discharged on antibiotics.  She reports since then that the culture was negative.  Has not been taking any medication for her pain.    On exam, vitals normal, well-appearing, no midline tenderness, some tenderness in the left lumbar paraspinal muscles without deformity or bruising.  Strength and sensation intact in the lower extremities.    Differential: Lumbar strain, low back pain.  Low concern for spinal cord pathology, intra-abdominal pathology, or other emergent pathology.    Patient was given Toradol, Tylenol, and a lidocaine patch and a similar regimen for home.  She was also given PMD follow-up instructions with careful return precautions.    MDM    Disposition and Plan     Clinical Impression:  1. Acute left-sided low back pain without sciatica         Disposition:  Discharge  3/13/2025 10:25 am    Follow-up:  Yogi Dover DO  [address] [place] [id number]  [phone]    Follow up in 1 week(s)  As needed    We recommend that you schedule follow up care with a primary care provider within the next three months to obtain basic health screening including reassessment of your blood pressure.      Medications Prescribed:  Discharge Medication List as of 3/13/2025 10:39 AM        START taking these medications    Details   acetaminophen 500 MG Oral Tab Take 2 tablets (1,000 mg total) by mouth every 6  (six) hours as needed for Pain., Normal, Disp-56 tablet, R-0      ibuprofen 200 MG Oral Tab Take 2 tablets (400 mg total) by mouth every 6 (six) hours as needed for Pain., Normal, Disp-56 tablet, R-0      lidocaine 5 % External Patch Place 1 patch onto the skin daily for 14 days., Normal, Disp-14 patch, R-0                 Supplementary Documentation:

## 2025-06-06 ENCOUNTER — TELEPHONE (OUTPATIENT)
Dept: FAMILY MEDICINE CLINIC | Facility: CLINIC | Age: 60
End: 2025-06-06

## (undated) DEVICE — LOWER EXTREMITY: Brand: MEDLINE INDUSTRIES, INC.

## (undated) DEVICE — SOLUTION  .9 1000ML BTL

## (undated) DEVICE — 12 ML SYRINGE LUER-LOCK TIP: Brand: MONOJECT

## (undated) DEVICE — PRECISION (9.0 X 0.51 X 31.0MM)

## (undated) DEVICE — KIT ENDO ORCAPOD 160/180/190

## (undated) DEVICE — WIRE K SMALL .045: Type: IMPLANTABLE DEVICE | Site: TOE

## (undated) DEVICE — PRECISION (9.0 X 0.51 X 25.0MM)

## (undated) DEVICE — SCANLAN® PINCAP® ORTHOPEDIC PIN PROTECTORS - WHITE, FITS 3 PIN/WIRE SIZES: 0.71, 0.89, 1.14 MM (2/STERILE PKG): Brand: SCANLAN® PINCAP® ORTHOPEDIC PIN PROTECTORS

## (undated) DEVICE — STERILE TETRA-FLEX CF, ELASTIC BANDAGE LATEX FREE 6IN X5.5 YD: Brand: TETRA-FLEX™CF

## (undated) DEVICE — DISPOSABLE TOURNIQUET CUFF SINGLE BLADDER, DUAL PORT AND QUICK CONNECT CONNECTOR: Brand: COLOR CUFF

## (undated) DEVICE — BANDAID

## (undated) DEVICE — CHLORAPREP ORANGE TINT 10.5ML

## (undated) DEVICE — OMNIPAQUE 240ML VIAL

## (undated) DEVICE — COTTON UNDERCAST PADDING,REGULAR FINISH: Brand: WEBRIL

## (undated) DEVICE — UNDYED BRAIDED (POLYGLACTIN 910), SYNTHETIC ABSORBABLE SUTURE: Brand: COATED VICRYL

## (undated) DEVICE — TRAY SKIN PREP PVP-1

## (undated) DEVICE — BANDAGE ROLL,100% COTTON, 6 PLY, LARGE: Brand: KERLIX

## (undated) DEVICE — KIT CLEAN ENDOKIT 1.1OZ GOWNX2

## (undated) DEVICE — PEN: MARKING STD PT 100/CS: Brand: MEDICAL ACTION INDUSTRIES

## (undated) DEVICE — STERILE TETRA-FLEX CF, ELASTIC BANDAGE, 4" X 5.5YD: Brand: TETRA-FLEX™CF

## (undated) DEVICE — GAMMEX® PI HYBRID SIZE 7.5, STERILE POWDER-FREE SURGICAL GLOVE, POLYISOPRENE AND NEOPRENE BLEND: Brand: GAMMEX

## (undated) DEVICE — CANNULATED DRILL BIT: Brand: MINI

## (undated) DEVICE — MEDI-VAC NON-CONDUCTIVE SUCTION TUBING 6MM X 1.8M (6FT.) L: Brand: CARDINAL HEALTH

## (undated) DEVICE — 60 ML SYRINGE REGULAR TIP: Brand: MONOJECT

## (undated) DEVICE — STERILE LATEX POWDER-FREE SURGICAL GLOVESWITH NITRILE COATING: Brand: PROTEXIS

## (undated) DEVICE — CONMED SCOPE SAVER BITE BLOCK, 20X27 MM: Brand: SCOPE SAVER

## (undated) DEVICE — SYRINGE MNJCT 10ML LF STRL REG

## (undated) DEVICE — Device: Brand: DUAL NARE NASAL CANNULAE FEMALE LUER CON 7FT O2 TUBE

## (undated) DEVICE — GAUZE SPONGES,12 PLY: Brand: CURITY

## (undated) DEVICE — GIJAW SINGLE-USE BIOPSY FORCEPS WITH NEEDLE: Brand: GIJAW

## (undated) DEVICE — PERIFIX® 18 GA. X 3-1/2 IN. (90 MM) TUOHY, PERIFIX 20 GA. CLOSED TIP CATHETER, 5 ML GLASS LUER LOCK LOR TRAY (KIT): Brand: PERIFIX®

## (undated) DEVICE — ALARIS SMARTSITE EXTENSION SET: Brand: ALARIS, SMARTSITE

## (undated) DEVICE — PADDING CAST WYTEX 2" STER

## (undated) DEVICE — MEDI-VAC NON-CONDUCTIVE SUCTION TUBING: Brand: CARDINAL HEALTH

## (undated) DEVICE — STANDARD HYPODERMIC NEEDLE,POLYPROPYLENE HUB: Brand: MONOJECT

## (undated) DEVICE — BANDAGE COMP PREMPRO 5YDX4IN

## (undated) DEVICE — SUT ETHILON 4-0 FS-2 662G

## (undated) DEVICE — NEEDLE SPINAL 25X3-1/2 BLUE

## (undated) DEVICE — GAMMEX® NON-LATEX PI ORTHO SIZE 7.5, STERILE POLYISOPRENE POWDER-FREE SURGICAL GLOVE: Brand: GAMMEX

## (undated) DEVICE — YANKAUER,BULB TIP,W/O VENT,RIGID,STERILE: Brand: MEDLINE

## (undated) DEVICE — PRECISION (7.0 X 0.51 X 18.5MM)

## (undated) DEVICE — NON-THREADED KWIRE: Brand: MINI

## (undated) DEVICE — SUT VICRYL 2-0 FS-1 J443H

## (undated) NOTE — LETTER
5/26/2021              Alexandro Perdue         Dear Chelsea Hoang,      It was a pleasure to see you at our [address] [place] [address] office.   Your mammogram was negative / n

## (undated) NOTE — LETTER
[facility]  Administration of Anesthesia  AVINASH Edith Mohamud agree to be cared for by a physician anesthesiologist alone and/or with a nurse anesthetist, who is specially trained to monitor me and give me medicine to put me to sleep or keep me comfortable during my procedure    I understand that my anesthesiologist and/or anesthetist is not an employee or agent of [facility] or [employer]. He or she works for [facility]    As the patient asking for anesthesia services, I agree to:  Allow the anesthesiologist (anesthesia doctor) to give me medicine and do additional procedures as necessary. Some examples are: Starting or using an “IV” to give me medicine, fluids or blood during my procedure, and having a breathing tube placed to help me breathe when I’m asleep (intubation). In the event that my heart stops working properly, I understand that my anesthesiologist will make every effort to sustain my life, unless otherwise directed by [facility] Do Not Resuscitate documents.  Tell my anesthesia doctor before my procedure:  If I am pregnant.  The last time that I ate or drank.  iii. All of the medicines I take (including prescriptions, herbal supplements, and pills I can buy without a prescription (including street drugs/illegal medications). Failure to inform my anesthesiologist about these medicines may increase my risk of anesthetic complications.  iv.If I am allergic to anything or have had a reaction to anesthesia before.  I understand how the anesthesia medicine will help me (benefits).  I understand that with any type of anesthesia medicine there are risks:  The most common risks are: nausea, vomiting, sore throat, muscle soreness, damage to my eyes, mouth, or teeth (from breathing tube placement).  Rare risks include: remembering what happened during my procedure, allergic reactions to medications, injury to my airway, heart, lungs, vision, nerves, or  muscles and in extremely rare instances death.  My doctor has explained to me other choices available to me for my care (alternatives).  Pregnant Patients (“epidural”):  I understand that the risks of having an epidural (medicine given into my back to help control pain during labor), include itching, low blood pressure, difficulty urinating, headache or slowing of the baby’s heart. Very rare risks include infection, bleeding, seizure, irregular heart rhythms and nerve injury.  Regional Anesthesia (“spinal”, “epidural”, & “nerve blocks”):  I understand that rare but potential complications include headache, bleeding, infection, seizure, irregular heart rhythms, and nerve injury.    _____________________________________________________________________________  Patient (or Representative) Signature/Relationship to Patient  Date   Time    _____________________________________________________________________________   Name (if used)    Language/Organization   Time    _____________________________________________________________________________  Nurse Anesthetist Signature     Date   Time  _____________________________________________________________________________  Anesthesiologist Signature     Date   Time  I have discussed the procedure and information above with the patient (or patient’s representative) and answered their questions. The patient or their representative has agreed to have anesthesia services.    _____________________________________________________________________________  Witness        Date   Time  I have verified that the signature is that of the patient or patient’s representative, and that it was signed before the procedure  Patient Name: Edith Mohamud     : 1965                 Printed: 4/15/2024 at 7:38 AM    Medical Record #: [id number]                                            Page 1 of 1  ----------ANESTHESIA CONSENT----------

## (undated) NOTE — LETTER
8/11/2023          To Whom It May Concern:    Sandra Vargas is currently under my medical care and may  return to work as of Monday 8/14/2023 with the following restriction: she may work only 4 hours per day for the next month. If you require additional information please contact our office.         Sincerely,    Amari Mckeon DPM

## (undated) NOTE — LETTER
9/1/2022          To Whom It May Concern:    Lizette Diehl is currently under my medical care and will be having surgery 9/8/22. She will be out of work for 7 weeks following surgery. If you require additional information please contact our office.         Sincerely,    Ashleigh Page DPM

## (undated) NOTE — MR AVS SNAPSHOT
[address]  [address] [facility]  [phone]               Thank you for choosing us for your health care visit with Edwar Dean DO.   We are glad to serve you and happy to provide you with this summary authorization numbers or be assured that none are required. You can then schedule your appointment. Failure to obtain required authorization numbers can create reimbursement difficulties for you.           Can see the doctors at the [address] [place] [address] TiZANidine HCl 4 MG Tabs   TAKE 1 TABLET(4 MG) BY MOUTH EVERY 8 HOURS AS NEEDED   What changed:  Another medication with the same name was removed. Continue taking this medication, and follow the directions you see here.    Commonly known as:  Evelyn Oglesby Water is best for hydration Fast food. Eat at home when possible     Tips for increasing your physical activity – Adults who are physically active are less likely to develop some chronic diseases than adults who are inactive.      HOW TO GET STARTED: HOW

## (undated) NOTE — LETTER
01/14/22        [address] [address]      Dear Berto To,    Our records indicate that you have outstanding lab work and or testing that was ordered for you and has not yet been completed:  Chun Porter (Order #

## (undated) NOTE — LETTER
7/13/2022          To Whom It May Concern:    Arian Daniels is currently under my medical care and may return to work. If you require additional information please contact our office.         Sincerely,    Miguel Garcia DPM

## (undated) NOTE — ED AVS SNAPSHOT
[facility] Emergency Department    [place] [address]     [place] [address]    Phone:  [phone]    Fax:  Raghu Euceda   MRN: [id number]    Department:  [facility] Emergency Department   Date of Visit:  5/29/ vicks vaporub to chest wall  followup with pmd in 2-3 days    Discharge References/Attachments     ASTHMA, DISCHARGE INSTRUCTIONS FOR (ENGLISH)    BRONCHITIS WITH WHEEZING (ADULT) (ENGLISH)      Disclosure     Insurance plans vary and the physician(s) refe IF THERE IS ANY CHANGE OR WORSENING OF YOUR CONDITION, CALL YOUR PRIMARY CARE PHYSICIAN AT ONCE OR RETURN IMMEDIATELY TO THE EMERGENCY DEPARTMENT.     If you have been prescribed any medication(s), please fill your prescription right away and begin taking t - If you have concerns related to behavioral health issues or thoughts of harming yourself, contact [address] [place] [address] at [phone].     - If you don’t have insurance, Elayne Mina has partnered with [employer]

## (undated) NOTE — LETTER
AUTHORIZATION FOR SURGICAL OPERATION OR OTHER PROCEDURE    1. I hereby authorize Dr. Wale Robledo, and [place] to be [place] [facility] staff assigned to my case to perform the following operation and/or procedure at the [facility], [facility]:    _______________________________________________________________________________________________    Incision and Drainage procedure to Right foot   _______________________________________________________________________________________________    2. My physician has explained the nature and purpose of the operation or other procedure, possible alternative methods of treatment, the risks involved, and the possibility of complication to me. I acknowledge that no guarantee has been made as to the result that may be obtained. 3.  I recognize that, during the course of this operation, or other procedure, unforseen conditions may necessitate additional or different procedure than those listed above. I, therefore, further authorize and request that the above named physician, his/her physician assistants or designees perform such procedures as are, in his/her professional opinion, necessary and desirable. 4.  Any tissue or organs removed in the operation or other procedure may be disposed of by and at the discretion of the [facility], [facility] and [facility] AT [facility]. 5.  I understand that in the event of a medical emergency, I will be transported by local paramedics to [facility] or other hospital emergency department. 6.  I certify that I have read and fully understand the above consent to operation and/or other procedure. 7.  I acknowledge that my physician has explained sedation/analgesia administration to me including the risks and benefits. I consent to the administration of sedation/analgesia as may be necessary or desirable in the judgement of my physician.     Witness signature: ___________________________________________________ Date:  ______/______/_____ Time:  ________ A. M.  P.M. Patient Name:  ______________________________________________________  (please print)      Patient signature:  ___________________________________________________             Relationship to Patient:           []  Parent    Responsible person                          []  Spouse  In case of minor or                    [] Other  _____________   Incompetent name:  __________________________________________________                               (please print)      _____________      Responsible person  In case of minor or  Incompetent signature:  _______________________________________________    Statement of Physician  My signature below affirms that prior to the time of the procedure, I have explained to the patient and/or his/her guardian, the risks and benefits involved in the proposed treatment and any reasonable alternative to the proposed treatment. I have also explained the risks and benefits involved in the refusal of the proposed treatment and have answered the patient's questions.                         Date:  ______/______/_______  Provider                      Signature:  __________________________________________________________       Time:  ___________ A.M    P.M.

## (undated) NOTE — LETTER
AUTHORIZATION FOR SURGICAL OPERATION OR OTHER PROCEDURE    1.  I hereby authorize Dr. Ravi Kaur  and [facility] staff assigned to my case to perform the following operation and/or procedure at the [facility]:    _______________________________ ________ A. M.  P.M.        Patient Name:  ______________________________________________________  (please print)      Patient signature:  ___________________________________________________             Relationship to Patient:           []  Parent    Respon

## (undated) NOTE — LETTER
21      Patient: Brennen Roland  : 1965 Visit date: 2021    Dear Krystle Angelo,      I examined your patient in consultation today. She has a minimally symptomatic ganglion of the left middle finger.   It does not need to be ex

## (undated) NOTE — LETTER
11/14/2022          To Whom It May Concern:    Ayla Martinez is currently under my medical care and may not return to work at this time. Please excuse Torrance Navneet for 2 weeks. She may return to work on 11/28/22. If you require additional information please contact our office.         Sincerely,    Osman Mckeon DPM

## (undated) NOTE — ED AVS SNAPSHOT
[facility] Emergency Department    [place] [address]     [address] [address]    Phone:  [phone]    Fax:  [phone]   MRN: [id number]    Department:  [facility] Emergency Department   Date of Visit:  5/29/ and Class Registration line at [phone] or find a doctor online by visiting [email or web address].    IF THERE IS ANY CHANGE OR WORSENING OF YOUR CONDITION, CALL YOUR PRIMARY CARE PHYSICIAN AT ONCE OR RETURN IMMEDIATELY TO [address] [place] [address].     If

## (undated) NOTE — LETTER
5/1/2018          To Whom It May Concern:    Gay Johns is currently under my medical care and may not return to work at this time. Please excuse Chelsea Hoang for 2 days. She may return to work on May 3 2018. Activity is restricted as follows: none.

## (undated) NOTE — LETTER
[address] [place], [place] [address]PHYSIATR   Date:   11/29/2021     Name:   Augustina Farley    YOB: 1965   MRN:   [id number]       WHERE IS YOUR PAIN NOW?   Yaniv the areas on your body where you feel the described sen

## (undated) NOTE — LETTER
Date & Time: 7/1/2022, 10:28 AM  Patient: Claus Krueger  Encounter Provider(s):    SONIDO Phillips       To Whom It May Concern:    Jarad Barfield was seen and treated in our department on 7/1/2022. She should not return to work until 07/04/2022 due to foreign body removal to foot.     Shiloh Rodriguez

## (undated) NOTE — Clinical Note
Dear Mesha Bedolla,    Thank you for sending Audra Skaggs to see me for physiatry consultation. I appreciate your confidence in me to care for your patients. Please feel free call me with any questions at [phone] or contact me through [facility]     Sincerely,  JENNY

## (undated) NOTE — LETTER
[facility]  [address], [place], [place]  Authorization for Surgical Operation and Procedure                                                                                           I hereby authorize Nhan Mcclendon MD, my physician and his/her assistants (if applicable), which may include medical students, residents, and/or fellows, to perform the following surgical operation/ procedure and administer such anesthesia as may be determined necessary by my physician: Operation/Procedure name (s) ESOPHAGOGASTRODUODENOSCOPY on Edith Mohamud   2.   I recognize that during the surgical operation/procedure, unforeseen conditions may necessitate additional or different procedures than those listed above.  I, therefore, further authorize and request that the above-named surgeon, assistants, or designees perform such procedures as are, in their judgment, necessary and desirable.    3.   My surgeon/physician has discussed prior to my surgery the potential benefits, risks and side effects of this procedure; the likelihood of achieving goals; and potential problems that might occur during recuperation.  They also discussed reasonable alternatives to the procedure, including risks, benefits, and side effects related to the alternatives and risks related to not receiving this procedure.  I have had all my questions answered and I acknowledge that no guarantee has been made as to the result that may be obtained.    4.   Should the need arise during my operation/procedure, which includes change of level of care prior to discharge, I also consent to the administration of blood and/or blood products.  Further, I understand that despite careful testing and screening of blood or blood products by collecting agencies, I may still be subject to ill effects as a result of receiving a blood transfusion and/or blood products.  The following are some, but not all, of the potential risks that can occur: fever and  allergic reactions, hemolytic reactions, transmission of diseases such as Hepatitis, AIDS and Cytomegalovirus (CMV) and fluid overload.  In the event that I wish to have an autologous transfusion of my own blood, or a directed donor transfusion, I will discuss this with my physician.  Check only if Refusing Blood or Blood Products  I understand refusal of blood or blood products as deemed necessary by my physician may have serious consequences to my condition to include possible death. I hereby assume responsibility for my refusal and release the hospital, its personnel, and my physicians from any responsibility for the consequences of my refusal.    o  Refuse   5.   I authorize the use of any specimen, organs, tissues, body parts or foreign objects that may be removed from my body during the operation/procedure for diagnosis, research or teaching purposes and their subsequent disposal by hospital authorities.  I also authorize the release of specimen test results and/or written reports to my treating physician on the hospital medical staff or other referring or consulting physicians involved in my care, at the discretion of the Pathologist or my treating physician.    6.   I consent to the photographing or videotaping of the operations or procedures to be performed, including appropriate portions of my body for medical, scientific, or educational purposes, provided my identity is not revealed by the pictures or by descriptive texts accompanying them.  If the procedure has been photographed/videotaped, the surgeon will obtain the original picture, image, videotape or CD.  The hospital will not be responsible for storage, release or maintenance of the picture, image, tape or CD.    7.   I consent to the presence of a  or observers in the operating room as deemed necessary by my physician or their designees.    8.   I recognize that in the event my procedure results in extended X-Ray/fluoroscopy  time, I may develop a skin reaction.    9. If I have a Do Not Attempt Resuscitation (DNAR) order in place, that status will be suspended while in the operating room, procedural suite, and during the recovery period unless otherwise explicitly stated by me (or a person authorized to consent on my behalf). The surgeon or my attending physician will determine when the applicable recovery period ends for purposes of reinstating the DNAR order.  10. Patients having a sterilization procedure: I understand that if the procedure is successful the results will be permanent and it will therefore be impossible for me to inseminate, conceive, or bear children.  I also understand that the procedure is intended to result in sterility, although the result has not been guaranteed.   11. I acknowledge that my physician has explained sedation/analgesia administration to me including the risk and benefits I consent to the administration of sedation/analgesia as may be necessary or desirable in the judgment of my physician.    I CERTIFY THAT I HAVE READ AND FULLY UNDERSTAND THE ABOVE CONSENT TO OPERATION and/or OTHER PROCEDURE.     _________________________________________ _________________________________     ___________________________________  Signature of Patient     Signature of Responsible Person                   Printed Name of Responsible Person                              _________________________________________ ______________________________        ___________________________________  Signature of Witness         Date  Time         Relationship to Patient    STATEMENT OF PHYSICIAN My signature below affirms that prior to the time of the procedure; I have explained to the patient and/or his/her legal representative, the risks and benefits involved in the proposed treatment and any reasonable alternative to the proposed treatment. I have also explained the risks and benefits involved in refusal of the proposed treatment and  alternatives to the proposed treatment and have answered the patient's questions. If I have a significant financial interest in a co-management agreement or a significant financial interest in any product or implant, or other significant relationship used in this procedure/surgery, I have disclosed this and had a discussion with my patient.     _______________________________________________________________ _____________________________  (Signature of Physician)                                                                                         (Date)                                   (Time)  Patient Name: Edith CHARLES Cade    : 1965   Printed: 4/15/2024      Medical Record #: [id number]                                              Page 1 of 1

## (undated) NOTE — MR AVS SNAPSHOT
After Visit Summary   4/26/2021    Salinas Stagemouna    MRN: [id number]           Visit Information     Date & Time  4/26/2021 10:20 AM Provider  Eleanor Alanis MD [address] [place] [address], [address] [place] [address],[address], [facility]  Phone  [address] [facility]  Re[facility] Services in [place]    4/29/2021 2:30 PM Gabino Dick P.[address] in [place]    5/3/2021 11:30 AM Moreno Johnson.Sofi [place]  [facility] [place]    5/6/2021 2:30 PM Gabino Sweeney., [facility] login Username and cannot be changed, so think of one that is secure and easy to remember. 6. Create a [employer] password. You can change your password at any time. 7. Choose a Security Question and enter your Answer and click Next.  This can be used at a l DAY APPOINTMENTS   Available at primary care offices    AFTER HOURS CARE  [place]  OFFICE VISIT   Primary Care Providers  Treatment for mild illness or injury that does not require immediate attention.  Average cost  $70*   [facility]  [place] [facility] [place]